# Patient Record
Sex: FEMALE | Race: WHITE | NOT HISPANIC OR LATINO | Employment: OTHER | URBAN - NONMETROPOLITAN AREA
[De-identification: names, ages, dates, MRNs, and addresses within clinical notes are randomized per-mention and may not be internally consistent; named-entity substitution may affect disease eponyms.]

---

## 2017-01-23 ENCOUNTER — HOSPITAL ENCOUNTER (OUTPATIENT)
Dept: MAMMOGRAPHY | Facility: HOSPITAL | Age: 67
Discharge: HOME | End: 2017-01-23
Attending: SPECIALIST | Admitting: SPECIALIST

## 2017-01-23 ENCOUNTER — HOSPITAL ENCOUNTER (OUTPATIENT)
Dept: ULTRASOUND IMAGING | Facility: HOSPITAL | Age: 67
Discharge: HOME | End: 2017-01-23
Attending: SPECIALIST

## 2017-01-23 DIAGNOSIS — Z09 FOLLOW UP: ICD-10-CM

## 2017-01-23 DIAGNOSIS — Z09 FOLLOW-UP EXAM, 3-6 MONTHS SINCE PREVIOUS EXAM: ICD-10-CM

## 2017-01-23 DIAGNOSIS — N60.02 BREAST CYST, LEFT: ICD-10-CM

## 2017-01-23 PROCEDURE — 76642 ULTRASOUND BREAST LIMITED: CPT

## 2017-01-23 PROCEDURE — G0279 TOMOSYNTHESIS, MAMMO: HCPCS

## 2017-01-23 PROCEDURE — G0206 DX MAMMO INCL CAD UNI: HCPCS

## 2017-01-24 ENCOUNTER — TRANSCRIBE ORDERS (OUTPATIENT)
Dept: ADMINISTRATIVE | Facility: HOSPITAL | Age: 67
End: 2017-01-24

## 2017-01-24 DIAGNOSIS — Z12.31 ENCOUNTER FOR SCREENING MAMMOGRAM FOR MALIGNANT NEOPLASM OF BREAST: Primary | ICD-10-CM

## 2017-07-24 ENCOUNTER — HOSPITAL ENCOUNTER (OUTPATIENT)
Dept: MAMMOGRAPHY | Facility: HOSPITAL | Age: 67
Discharge: HOME | End: 2017-07-24
Attending: SPECIALIST | Admitting: SPECIALIST

## 2017-07-24 DIAGNOSIS — Z12.31 ENCOUNTER FOR SCREENING MAMMOGRAM FOR MALIGNANT NEOPLASM OF BREAST: ICD-10-CM

## 2017-07-24 PROCEDURE — 77063 BREAST TOMOSYNTHESIS BI: CPT

## 2017-07-24 PROCEDURE — G0202 SCR MAMMO BI INCL CAD: HCPCS

## 2017-07-31 ENCOUNTER — TRANSCRIBE ORDERS (OUTPATIENT)
Dept: ADMINISTRATIVE | Facility: HOSPITAL | Age: 67
End: 2017-07-31

## 2017-07-31 DIAGNOSIS — Z12.31 ENCOUNTER FOR SCREENING MAMMOGRAM FOR MALIGNANT NEOPLASM OF BREAST: Primary | ICD-10-CM

## 2018-03-21 ENCOUNTER — OFFICE VISIT (OUTPATIENT)
Dept: OBSTETRICS AND GYNECOLOGY | Facility: CLINIC | Age: 68
End: 2018-03-21

## 2018-03-21 VITALS
SYSTOLIC BLOOD PRESSURE: 132 MMHG | DIASTOLIC BLOOD PRESSURE: 84 MMHG | HEIGHT: 64 IN | WEIGHT: 170 LBS | BODY MASS INDEX: 29.02 KG/M2

## 2018-03-21 DIAGNOSIS — N81.6 RECTOCELE: ICD-10-CM

## 2018-03-21 DIAGNOSIS — Z78.0 MENOPAUSE: ICD-10-CM

## 2018-03-21 DIAGNOSIS — Z01.411 ENCOUNTER FOR GYNECOLOGICAL EXAMINATION WITH ABNORMAL FINDING: Primary | ICD-10-CM

## 2018-03-21 DIAGNOSIS — N39.3 SUI (STRESS URINARY INCONTINENCE, FEMALE): ICD-10-CM

## 2018-03-21 DIAGNOSIS — Z78.9 NONSMOKER: ICD-10-CM

## 2018-03-21 DIAGNOSIS — Z12.39 BREAST CANCER SCREENING: ICD-10-CM

## 2018-03-21 DIAGNOSIS — N95.2 VAGINAL ATROPHY: ICD-10-CM

## 2018-03-21 PROCEDURE — G0101 CA SCREEN;PELVIC/BREAST EXAM: HCPCS | Performed by: OBSTETRICS & GYNECOLOGY

## 2018-03-21 RX ORDER — DIPHENHYDRAMINE HYDROCHLORIDE 25 MG/1
TABLET ORAL
COMMUNITY

## 2018-03-21 RX ORDER — PHENOL 1.4 %
600 AEROSOL, SPRAY (ML) MUCOUS MEMBRANE DAILY
COMMUNITY

## 2018-03-21 RX ORDER — MELATONIN
1000 DAILY
COMMUNITY

## 2018-03-21 RX ORDER — LOSARTAN POTASSIUM 50 MG/1
100 TABLET ORAL DAILY
COMMUNITY

## 2018-03-21 RX ORDER — ASPIRIN 81 MG/1
81 TABLET, CHEWABLE ORAL DAILY
COMMUNITY

## 2018-03-21 RX ORDER — AMLODIPINE BESYLATE 10 MG/1
10 TABLET ORAL DAILY
COMMUNITY

## 2018-03-21 RX ORDER — ESTRADIOL 0.1 MG/G
2 CREAM VAGINAL 2 TIMES WEEKLY
Qty: 42.5 G | Refills: 12 | Status: SHIPPED | OUTPATIENT
Start: 2018-03-22

## 2018-03-21 RX ORDER — OMEPRAZOLE 20 MG/1
20 CAPSULE, DELAYED RELEASE ORAL DAILY
COMMUNITY

## 2018-03-21 RX ORDER — SUCRALFATE 1 G/1
1 TABLET ORAL 4 TIMES DAILY
COMMUNITY

## 2018-03-21 RX ORDER — ASCORBIC ACID 500 MG
500 TABLET ORAL DAILY
COMMUNITY

## 2018-07-25 ENCOUNTER — HOSPITAL ENCOUNTER (OUTPATIENT)
Dept: MAMMOGRAPHY | Facility: HOSPITAL | Age: 68
Discharge: HOME | End: 2018-07-25
Attending: SPECIALIST | Admitting: SPECIALIST

## 2018-07-25 ENCOUNTER — HOSPITAL ENCOUNTER (OUTPATIENT)
Dept: BONE DENSITY | Facility: HOSPITAL | Age: 68
Discharge: HOME | End: 2018-07-25
Attending: OBSTETRICS & GYNECOLOGY

## 2018-07-25 DIAGNOSIS — Z78.0 MENOPAUSE: ICD-10-CM

## 2018-07-25 DIAGNOSIS — Z12.31 ENCOUNTER FOR SCREENING MAMMOGRAM FOR MALIGNANT NEOPLASM OF BREAST: ICD-10-CM

## 2018-07-25 PROCEDURE — 77067 SCR MAMMO BI INCL CAD: CPT

## 2018-07-25 PROCEDURE — 77063 BREAST TOMOSYNTHESIS BI: CPT

## 2018-07-25 PROCEDURE — 77080 DXA BONE DENSITY AXIAL: CPT

## 2018-07-30 ENCOUNTER — TRANSCRIBE ORDERS (OUTPATIENT)
Dept: ADMINISTRATIVE | Facility: HOSPITAL | Age: 68
End: 2018-07-30

## 2018-07-30 DIAGNOSIS — Z12.39 BREAST SCREENING: Primary | ICD-10-CM

## 2019-07-24 ENCOUNTER — HOSPITAL ENCOUNTER (OUTPATIENT)
Dept: MAMMOGRAPHY | Facility: HOSPITAL | Age: 69
Discharge: HOME | End: 2019-07-24
Attending: SPECIALIST | Admitting: SPECIALIST

## 2019-07-24 DIAGNOSIS — Z12.39 BREAST SCREENING: ICD-10-CM

## 2019-07-24 PROCEDURE — 77067 SCR MAMMO BI INCL CAD: CPT

## 2019-07-24 PROCEDURE — 77063 BREAST TOMOSYNTHESIS BI: CPT

## 2019-07-26 ENCOUNTER — APPOINTMENT (OUTPATIENT)
Dept: MAMMOGRAPHY | Facility: HOSPITAL | Age: 69
End: 2019-07-26
Attending: SPECIALIST

## 2019-08-07 ENCOUNTER — TRANSCRIBE ORDERS (OUTPATIENT)
Dept: ADMINISTRATIVE | Facility: HOSPITAL | Age: 69
End: 2019-08-07

## 2019-08-07 DIAGNOSIS — Z12.31 ENCOUNTER FOR SCREENING MAMMOGRAM FOR MALIGNANT NEOPLASM OF BREAST: Primary | ICD-10-CM

## 2020-05-20 ENCOUNTER — TRANSCRIBE ORDERS (OUTPATIENT)
Dept: ADMINISTRATIVE | Facility: HOSPITAL | Age: 70
End: 2020-05-20

## 2020-05-20 DIAGNOSIS — Z78.0 POSTMENOPAUSAL STATUS: Primary | ICD-10-CM

## 2020-07-27 ENCOUNTER — HOSPITAL ENCOUNTER (OUTPATIENT)
Dept: BONE DENSITY | Facility: HOSPITAL | Age: 70
Discharge: HOME | End: 2020-07-27

## 2020-07-27 ENCOUNTER — HOSPITAL ENCOUNTER (OUTPATIENT)
Dept: MAMMOGRAPHY | Facility: HOSPITAL | Age: 70
Discharge: HOME | End: 2020-07-27
Admitting: SPECIALIST

## 2020-07-27 DIAGNOSIS — Z12.31 ENCOUNTER FOR SCREENING MAMMOGRAM FOR MALIGNANT NEOPLASM OF BREAST: ICD-10-CM

## 2020-07-27 DIAGNOSIS — Z78.0 POSTMENOPAUSAL STATUS: ICD-10-CM

## 2020-07-27 PROCEDURE — 77063 BREAST TOMOSYNTHESIS BI: CPT

## 2020-07-27 PROCEDURE — 77080 DXA BONE DENSITY AXIAL: CPT

## 2020-07-27 PROCEDURE — 77067 SCR MAMMO BI INCL CAD: CPT

## 2021-05-12 ENCOUNTER — TRANSCRIBE ORDERS (OUTPATIENT)
Dept: ADMINISTRATIVE | Facility: HOSPITAL | Age: 71
End: 2021-05-12

## 2021-05-12 DIAGNOSIS — Z12.31 ENCOUNTER FOR SCREENING MAMMOGRAM FOR MALIGNANT NEOPLASM OF BREAST: Primary | ICD-10-CM

## 2021-07-28 ENCOUNTER — HOSPITAL ENCOUNTER (OUTPATIENT)
Dept: MAMMOGRAPHY | Facility: HOSPITAL | Age: 71
Discharge: HOME | End: 2021-07-28
Admitting: INTERNAL MEDICINE

## 2021-07-28 DIAGNOSIS — Z12.31 ENCOUNTER FOR SCREENING MAMMOGRAM FOR MALIGNANT NEOPLASM OF BREAST: ICD-10-CM

## 2021-07-28 PROCEDURE — 77063 BREAST TOMOSYNTHESIS BI: CPT

## 2021-07-28 PROCEDURE — 77067 SCR MAMMO BI INCL CAD: CPT

## 2021-12-17 ENCOUNTER — TELEPHONE (OUTPATIENT)
Dept: INTERNAL MEDICINE | Age: 71
End: 2021-12-17

## 2021-12-17 ENCOUNTER — OFFICE VISIT (OUTPATIENT)
Dept: INTERNAL MEDICINE | Age: 71
End: 2021-12-17
Payer: MEDICARE

## 2021-12-17 VITALS
DIASTOLIC BLOOD PRESSURE: 84 MMHG | HEART RATE: 74 BPM | BODY MASS INDEX: 30.49 KG/M2 | OXYGEN SATURATION: 98 % | SYSTOLIC BLOOD PRESSURE: 138 MMHG | HEIGHT: 64 IN | WEIGHT: 178.6 LBS

## 2021-12-17 DIAGNOSIS — N64.4 BREAST PAIN: ICD-10-CM

## 2021-12-17 DIAGNOSIS — R59.1 LYMPHADENOPATHY: ICD-10-CM

## 2021-12-17 DIAGNOSIS — E55.9 VITAMIN D DEFICIENCY: ICD-10-CM

## 2021-12-17 DIAGNOSIS — I10 PRIMARY HYPERTENSION: ICD-10-CM

## 2021-12-17 DIAGNOSIS — Z00.00 ENCOUNTER FOR MEDICAL EXAMINATION TO ESTABLISH CARE: Primary | ICD-10-CM

## 2021-12-17 DIAGNOSIS — N64.4 BREAST PAIN: Primary | ICD-10-CM

## 2021-12-17 DIAGNOSIS — N63.0 LUMP IN FEMALE BREAST: ICD-10-CM

## 2021-12-17 DIAGNOSIS — R74.8 ELEVATED LIVER ENZYMES: ICD-10-CM

## 2021-12-17 DIAGNOSIS — K21.9 GASTROESOPHAGEAL REFLUX DISEASE WITHOUT ESOPHAGITIS: ICD-10-CM

## 2021-12-17 LAB
ALBUMIN SERPL-MCNC: 4.3 G/DL (ref 3.5–5.2)
ALP BLD-CCNC: 114 U/L (ref 35–104)
ALT SERPL-CCNC: 64 U/L (ref 5–33)
ANION GAP SERPL CALCULATED.3IONS-SCNC: 9 MMOL/L (ref 7–19)
AST SERPL-CCNC: 57 U/L (ref 5–32)
BASOPHILS ABSOLUTE: 0 K/UL (ref 0–0.2)
BASOPHILS RELATIVE PERCENT: 0.5 % (ref 0–1)
BILIRUB SERPL-MCNC: 0.5 MG/DL (ref 0.2–1.2)
BUN BLDV-MCNC: 15 MG/DL (ref 8–23)
CALCIUM SERPL-MCNC: 10.2 MG/DL (ref 8.8–10.2)
CHLORIDE BLD-SCNC: 103 MMOL/L (ref 98–111)
CHOLESTEROL, TOTAL: 161 MG/DL (ref 160–199)
CO2: 30 MMOL/L (ref 22–29)
CREAT SERPL-MCNC: 0.7 MG/DL (ref 0.5–0.9)
EOSINOPHILS ABSOLUTE: 0.2 K/UL (ref 0–0.6)
EOSINOPHILS RELATIVE PERCENT: 2.8 % (ref 0–5)
GFR AFRICAN AMERICAN: >59
GFR NON-AFRICAN AMERICAN: >60
GLUCOSE BLD-MCNC: 95 MG/DL (ref 74–109)
HCT VFR BLD CALC: 49.1 % (ref 37–47)
HDLC SERPL-MCNC: 45 MG/DL (ref 65–121)
HEMOGLOBIN: 15.5 G/DL (ref 12–16)
IMMATURE GRANULOCYTES #: 0 K/UL
LACTATE DEHYDROGENASE: 241 U/L (ref 91–215)
LDL CHOLESTEROL CALCULATED: 85 MG/DL
LYMPHOCYTES ABSOLUTE: 3.1 K/UL (ref 1.1–4.5)
LYMPHOCYTES RELATIVE PERCENT: 39.5 % (ref 20–40)
MCH RBC QN AUTO: 29.7 PG (ref 27–31)
MCHC RBC AUTO-ENTMCNC: 31.6 G/DL (ref 33–37)
MCV RBC AUTO: 94.1 FL (ref 81–99)
MONOCYTES ABSOLUTE: 0.6 K/UL (ref 0–0.9)
MONOCYTES RELATIVE PERCENT: 7.9 % (ref 0–10)
NEUTROPHILS ABSOLUTE: 3.8 K/UL (ref 1.5–7.5)
NEUTROPHILS RELATIVE PERCENT: 49.2 % (ref 50–65)
PDW BLD-RTO: 13.8 % (ref 11.5–14.5)
PLATELET # BLD: 239 K/UL (ref 130–400)
PMV BLD AUTO: 9.6 FL (ref 9.4–12.3)
POTASSIUM SERPL-SCNC: 4.8 MMOL/L (ref 3.5–5)
RBC # BLD: 5.22 M/UL (ref 4.2–5.4)
SODIUM BLD-SCNC: 142 MMOL/L (ref 136–145)
TOTAL PROTEIN: 6.9 G/DL (ref 6.6–8.7)
TRIGL SERPL-MCNC: 156 MG/DL (ref 0–149)
TSH SERPL DL<=0.05 MIU/L-ACNC: 2.1 UIU/ML (ref 0.27–4.2)
VITAMIN D 25-HYDROXY: >100 NG/ML
WBC # BLD: 7.8 K/UL (ref 4.8–10.8)

## 2021-12-17 PROCEDURE — 99204 OFFICE O/P NEW MOD 45 MIN: CPT | Performed by: INTERNAL MEDICINE

## 2021-12-17 PROCEDURE — 1036F TOBACCO NON-USER: CPT | Performed by: INTERNAL MEDICINE

## 2021-12-17 PROCEDURE — 1090F PRES/ABSN URINE INCON ASSESS: CPT | Performed by: INTERNAL MEDICINE

## 2021-12-17 PROCEDURE — G8427 DOCREV CUR MEDS BY ELIG CLIN: HCPCS | Performed by: INTERNAL MEDICINE

## 2021-12-17 PROCEDURE — G8400 PT W/DXA NO RESULTS DOC: HCPCS | Performed by: INTERNAL MEDICINE

## 2021-12-17 PROCEDURE — 3017F COLORECTAL CA SCREEN DOC REV: CPT | Performed by: INTERNAL MEDICINE

## 2021-12-17 PROCEDURE — 1123F ACP DISCUSS/DSCN MKR DOCD: CPT | Performed by: INTERNAL MEDICINE

## 2021-12-17 PROCEDURE — G8484 FLU IMMUNIZE NO ADMIN: HCPCS | Performed by: INTERNAL MEDICINE

## 2021-12-17 PROCEDURE — G8417 CALC BMI ABV UP PARAM F/U: HCPCS | Performed by: INTERNAL MEDICINE

## 2021-12-17 PROCEDURE — 4040F PNEUMOC VAC/ADMIN/RCVD: CPT | Performed by: INTERNAL MEDICINE

## 2021-12-17 RX ORDER — METOPROLOL SUCCINATE 100 MG/1
100 TABLET, EXTENDED RELEASE ORAL DAILY
COMMUNITY
End: 2021-12-17 | Stop reason: DRUGHIGH

## 2021-12-17 RX ORDER — AMLODIPINE BESYLATE 5 MG/1
5 TABLET ORAL DAILY
COMMUNITY
End: 2021-12-17 | Stop reason: SDUPTHER

## 2021-12-17 RX ORDER — PANTOPRAZOLE SODIUM 40 MG/1
40 TABLET, DELAYED RELEASE ORAL DAILY
Qty: 90 TABLET | Refills: 0 | Status: SHIPPED | OUTPATIENT
Start: 2021-12-17 | End: 2022-04-11 | Stop reason: SDUPTHER

## 2021-12-17 RX ORDER — OMEPRAZOLE 20 MG/1
20 CAPSULE, DELAYED RELEASE ORAL DAILY
COMMUNITY
End: 2021-12-17 | Stop reason: ALTCHOICE

## 2021-12-17 RX ORDER — DIPHENHYDRAMINE HYDROCHLORIDE 25 MG/1
TABLET ORAL
COMMUNITY

## 2021-12-17 RX ORDER — METOPROLOL SUCCINATE 100 MG/1
50 TABLET, EXTENDED RELEASE ORAL 2 TIMES DAILY
Qty: 30 TABLET | Status: SHIPPED | COMMUNITY
Start: 2021-12-17 | End: 2021-12-17 | Stop reason: SDUPTHER

## 2021-12-17 RX ORDER — LOSARTAN POTASSIUM 50 MG/1
100 TABLET ORAL DAILY
COMMUNITY
End: 2021-12-17 | Stop reason: ALTCHOICE

## 2021-12-17 RX ORDER — AMLODIPINE BESYLATE 10 MG/1
10 TABLET ORAL DAILY
COMMUNITY
End: 2021-12-17 | Stop reason: DRUGHIGH

## 2021-12-17 RX ORDER — METOPROLOL SUCCINATE 100 MG/1
50 TABLET, EXTENDED RELEASE ORAL 2 TIMES DAILY
Qty: 90 TABLET | Refills: 1 | Status: SHIPPED | OUTPATIENT
Start: 2021-12-17 | End: 2022-04-11 | Stop reason: SDUPTHER

## 2021-12-17 RX ORDER — SUCRALFATE 1 G/1
1 TABLET ORAL 4 TIMES DAILY
COMMUNITY
End: 2022-02-09

## 2021-12-17 RX ORDER — ASCORBIC ACID 500 MG
500 TABLET ORAL DAILY
COMMUNITY

## 2021-12-17 RX ORDER — PANTOPRAZOLE SODIUM 40 MG/1
40 TABLET, DELAYED RELEASE ORAL DAILY
COMMUNITY
End: 2021-12-17 | Stop reason: SDUPTHER

## 2021-12-17 RX ORDER — ASPIRIN 81 MG/1
81 TABLET, CHEWABLE ORAL DAILY
COMMUNITY

## 2021-12-17 RX ORDER — AMLODIPINE BESYLATE 10 MG/1
5 TABLET ORAL 2 TIMES DAILY
Qty: 90 TABLET | Refills: 3 | Status: SHIPPED | OUTPATIENT
Start: 2021-12-17 | End: 2022-07-26

## 2021-12-17 SDOH — ECONOMIC STABILITY: FOOD INSECURITY: WITHIN THE PAST 12 MONTHS, THE FOOD YOU BOUGHT JUST DIDN'T LAST AND YOU DIDN'T HAVE MONEY TO GET MORE.: NEVER TRUE

## 2021-12-17 SDOH — ECONOMIC STABILITY: FOOD INSECURITY: WITHIN THE PAST 12 MONTHS, YOU WORRIED THAT YOUR FOOD WOULD RUN OUT BEFORE YOU GOT MONEY TO BUY MORE.: NEVER TRUE

## 2021-12-17 ASSESSMENT — ENCOUNTER SYMPTOMS
RECTAL PAIN: 0
VOICE CHANGE: 0
SINUS PRESSURE: 0
TROUBLE SWALLOWING: 0
ABDOMINAL PAIN: 0
ABDOMINAL DISTENTION: 0
PHOTOPHOBIA: 0
BLOOD IN STOOL: 0
SHORTNESS OF BREATH: 0
RHINORRHEA: 0
CHOKING: 0
VOMITING: 0
CHEST TIGHTNESS: 0
SORE THROAT: 0
COUGH: 0
WHEEZING: 0
EYE ITCHING: 0
ANAL BLEEDING: 0
EYE REDNESS: 0
EYE PAIN: 0
FACIAL SWELLING: 0
NAUSEA: 0
COLOR CHANGE: 0
CONSTIPATION: 0
EYE DISCHARGE: 0
DIARRHEA: 0
BACK PAIN: 0

## 2021-12-17 ASSESSMENT — PATIENT HEALTH QUESTIONNAIRE - PHQ9
SUM OF ALL RESPONSES TO PHQ QUESTIONS 1-9: 0
1. LITTLE INTEREST OR PLEASURE IN DOING THINGS: 0
SUM OF ALL RESPONSES TO PHQ9 QUESTIONS 1 & 2: 0
SUM OF ALL RESPONSES TO PHQ QUESTIONS 1-9: 0
2. FEELING DOWN, DEPRESSED OR HOPELESS: 0
SUM OF ALL RESPONSES TO PHQ QUESTIONS 1-9: 0

## 2021-12-17 ASSESSMENT — SOCIAL DETERMINANTS OF HEALTH (SDOH): HOW HARD IS IT FOR YOU TO PAY FOR THE VERY BASICS LIKE FOOD, HOUSING, MEDICAL CARE, AND HEATING?: NOT HARD AT ALL

## 2021-12-17 NOTE — PROGRESS NOTES
Chief Complaint   Patient presents with    New Patient       HPI: Kilo Rodriguez is a 69 y.o. female is here for establishment of care. She is a former patient of Dr. Viviana Barber. She has been concerned about her blood pressure. She states that at nighttime, it becomes quite elevated, up to 379 to 840 systolic. Sometimes, her face will get red. Sometimes she will have a headache. She denies any complaints of chest pain, chest pressure or shortness of breath. She says her acid reflux is well controlled. She did get a Covid vaccine about a month ago. She still having some lymphadenopathy in her left axillary area. She also complains of night sweats. She has had a history of breast surgery on the left side. She is quite concerned. We can certainly do an ultrasound of the left breast.  She also sees Dr. Iam Dubois for history of left hip bursitis. Has a history of vitamin D and B12 deficiencies. She does take supplementation. Sometimes, she takes Carafate if her acid reflux is acting up. Routine screening is as follows:  Eye exam yearly  Flu Vaccine is up-to-date  Pap smear: Hysterectomy  Mammogram July 2021  Colonoscpy July 2021 per Dr. Mayra Santiago  Pneumovax up to date    Past Medical History:   Diagnosis Date    GERD (gastroesophageal reflux disease)     Hypertension     Other hemorrhoids       Past Surgical History:   Procedure Laterality Date    BLADDER SURGERY  09/2011    bladder sling, vaginal prolaspe, rectocele, cystocele, enterocele    BREAST SURGERY Left 12/2007    duct removed.     CYST REMOVAL Left 07/2016    breast    HYSTERECTOMY, VAGINAL      RECTOCELE REPAIR  09/2011    TONSILLECTOMY  1968      Social History     Socioeconomic History    Marital status:      Spouse name: None    Number of children: None    Years of education: None    Highest education level: None   Occupational History    None   Tobacco Use    Smoking status: Never Smoker    Smokeless tobacco:  calcium carbonate 1500 (600 Ca) MG TABS tablet Take 600 mg by mouth daily      vitamin D (CHOLECALCIFEROL) 25 MCG (1000 UT) TABS tablet Take 1,000 Units by mouth daily      Cyanocobalamin 1000 MCG CAPS Take by mouth      metoprolol succinate (TOPROL XL) 100 MG extended release tablet Take 0.5 tablets by mouth 2 times daily 90 tablet 1    amLODIPine (NORVASC) 10 MG tablet Take 0.5 tablets by mouth 2 times daily 90 tablet 3    pantoprazole (PROTONIX) 40 MG tablet Take 1 tablet by mouth daily 90 tablet 0    sucralfate (CARAFATE) 1 GM tablet Take 1 g by mouth 4 times daily (Patient not taking: Reported on 12/17/2021)      MULTIPLE VITAMINS-MINERALS ER PO Take by mouth (Patient not taking: Reported on 12/17/2021)       No current facility-administered medications for this visit. There is no problem list on file for this patient. Review of Systems   Constitutional: Positive for diaphoresis and fever. Negative for activity change, appetite change, chills, fatigue and unexpected weight change. HENT: Negative for congestion, ear pain, facial swelling, hearing loss, mouth sores, nosebleeds, postnasal drip, rhinorrhea, sinus pressure, sneezing, sore throat, tinnitus, trouble swallowing and voice change. Eyes: Negative for photophobia, pain, discharge, redness, itching and visual disturbance. Respiratory: Negative for cough, choking, chest tightness, shortness of breath and wheezing. Cardiovascular: Negative for chest pain, palpitations and leg swelling. Gastrointestinal: Negative for abdominal distention, abdominal pain, anal bleeding, blood in stool, constipation, diarrhea, nausea, rectal pain and vomiting. Endocrine: Negative for cold intolerance, heat intolerance, polydipsia, polyphagia and polyuria. Genitourinary: Negative for decreased urine volume, difficulty urinating, dysuria, flank pain, frequency, hematuria and urgency.         Left-sided breast pain and left axillary adenopathy. Musculoskeletal: Negative for arthralgias, back pain, gait problem, joint swelling, myalgias, neck pain and neck stiffness. Skin: Negative for color change, pallor and rash. Allergic/Immunologic: Negative for environmental allergies and food allergies. Neurological: Positive for headaches. Negative for dizziness, tremors, syncope, weakness, light-headedness and numbness. A headache when her blood pressure is elevated   Hematological: Negative for adenopathy. Does not bruise/bleed easily. Psychiatric/Behavioral: Negative for agitation, behavioral problems, confusion, decreased concentration, dysphoric mood, hallucinations, self-injury, sleep disturbance and suicidal ideas. The patient is not nervous/anxious and is not hyperactive. /84   Pulse 74   Ht 5' 4\" (1.626 m)   Wt 178 lb 9.6 oz (81 kg)   SpO2 98%   BMI 30.66 kg/m²   Physical Exam  Vitals and nursing note reviewed. Constitutional:       General: She is not in acute distress. Appearance: Normal appearance. She is well-developed. She is not ill-appearing, toxic-appearing or diaphoretic. HENT:      Head: Normocephalic and atraumatic. Right Ear: Tympanic membrane, ear canal and external ear normal. There is no impacted cerumen. Left Ear: Tympanic membrane, ear canal and external ear normal. There is no impacted cerumen. Nose: Nose normal. No congestion or rhinorrhea. Mouth/Throat:      Mouth: Mucous membranes are moist.      Pharynx: Oropharynx is clear. No oropharyngeal exudate or posterior oropharyngeal erythema. Eyes:      General: No scleral icterus. Right eye: No discharge. Left eye: No discharge. Extraocular Movements: Extraocular movements intact. Conjunctiva/sclera: Conjunctivae normal.      Pupils: Pupils are equal, round, and reactive to light. Neck:      Thyroid: No thyromegaly. Vascular: No carotid bruit or JVD.       Trachea: No tracheal deviation. Cardiovascular:      Rate and Rhythm: Normal rate and regular rhythm. Pulses: Normal pulses. Heart sounds: Normal heart sounds. No murmur heard. No friction rub. No gallop. Pulmonary:      Effort: Pulmonary effort is normal. No respiratory distress. Breath sounds: Normal breath sounds. No stridor. No wheezing, rhonchi or rales. Chest:      Chest wall: No tenderness. Abdominal:      General: Bowel sounds are normal. There is no distension. Palpations: Abdomen is soft. There is no mass. Tenderness: There is no abdominal tenderness. There is no right CVA tenderness, left CVA tenderness, guarding or rebound. Hernia: No hernia is present. Genitourinary:     Comments: Does have some adenopathy under her left axillary area. She also has some tenderness on palpation of the left breast  Musculoskeletal:         General: No swelling, tenderness, deformity or signs of injury. Normal range of motion. Cervical back: Normal range of motion and neck supple. No rigidity. No muscular tenderness. Right lower leg: No edema. Left lower leg: No edema. Lymphadenopathy:      Cervical: No cervical adenopathy. Skin:     General: Skin is warm and dry. Coloration: Skin is not jaundiced or pale. Findings: No bruising, erythema, lesion or rash. Neurological:      General: No focal deficit present. Mental Status: She is alert and oriented to person, place, and time. Mental status is at baseline. Cranial Nerves: No cranial nerve deficit. Motor: No weakness or abnormal muscle tone. Coordination: Coordination normal.      Gait: Gait normal.      Deep Tendon Reflexes: Reflexes are normal and symmetric. Reflexes normal.   Psychiatric:         Mood and Affect: Mood normal.         Behavior: Behavior normal.         Thought Content:  Thought content normal.         Judgment: Judgment normal.         1. Encounter for medical examination to establish care    2. Gastroesophageal reflux disease without esophagitis    3. Primary hypertension    4. Lymphadenopathy    5. Vitamin D deficiency    6. Breast pain    7. Lump in female breast        ASSESSMENT/PLAN:    69-year-old woman here for follow-up    1. Encounter to establish care. We will check basic labs. 2.  Lymphadenopathy: We will check an LDH. Given the breast nodule, we will also check an ultrasound of the breast and axilla to rule out the possibility of a breast cyst versus suspicious lymph nodes. Do think that this is probably secondary to her Covid vaccine, but we must rule out other causes. 3.  History of vitamin D deficiency: Continue cholecalciferol    4. Hypertension: Increase Norvasc to twice daily dosing 5 mg. Continue metoprolol as prescribed    5. Acid reflux: Continue Protonix and Carafate as prescribed    Liset Flynn was seen today for new patient. Diagnoses and all orders for this visit:    Encounter for medical examination to establish care    Gastroesophageal reflux disease without esophagitis  -     pantoprazole (PROTONIX) 40 MG tablet; Take 1 tablet by mouth daily    Primary hypertension  -     metoprolol succinate (TOPROL XL) 100 MG extended release tablet; Take 0.5 tablets by mouth 2 times daily  -     amLODIPine (NORVASC) 10 MG tablet; Take 0.5 tablets by mouth 2 times daily  -     Comprehensive Metabolic Panel; Future  -     Lipid Panel; Future  -     TSH without Reflex; Future  -     Vitamin D 25 Hydroxy; Future    Lymphadenopathy  -     CBC Auto Differential; Future  -     Lactate Dehydrogenase; Future  -     Cancel: US BREAST COMPLETE LEFT; Future    Vitamin D deficiency  -     Vitamin D 25 Hydroxy; Future    Breast pain  -     US BREAST COMPLETE LEFT; Future    Lump in female breast  -     US BREAST COMPLETE LEFT; Future          Return in about 1 month (around 1/17/2022), or evaluate BP.      Orders Placed This Encounter   Procedures    US BREAST COMPLETE LEFT Standing Status:   Future     Standing Expiration Date:   12/17/2022     Order Specific Question:   Reason for exam:     Answer:   Thomas Katie in left breast with pain.     CBC Auto Differential     Fast 12 hours     Standing Status:   Future     Number of Occurrences:   1     Standing Expiration Date:   12/17/2022    Comprehensive Metabolic Panel     Fasting 12 hours     Standing Status:   Future     Number of Occurrences:   1     Standing Expiration Date:   12/17/2022    Lipid Panel     Standing Status:   Future     Number of Occurrences:   1     Standing Expiration Date:   12/17/2022     Order Specific Question:   Is Patient Fasting?/# of Hours     Answer:   12    TSH without Reflex     Fast 12 hours     Standing Status:   Future     Number of Occurrences:   1     Standing Expiration Date:   12/17/2022    Vitamin D 25 Hydroxy     Standing Status:   Future     Number of Occurrences:   1     Standing Expiration Date:   12/17/2022    Lactate Dehydrogenase     Standing Status:   Future     Number of Occurrences:   1     Standing Expiration Date:   12/17/2022       Laura Guillen MD

## 2021-12-17 NOTE — TELEPHONE ENCOUNTER
When I called pt about lab results she said that when they called her to schedule her breast US they told her she had to have a mammogram as well. Order pending.

## 2021-12-17 NOTE — TELEPHONE ENCOUNTER
----- Message from Danitza Ash MD sent at 12/17/2021 12:39 PM CST -----  Vitamin D is okay. Is actually a little bit on the high side. She can probably actually cut back on her vitamin D to about 3 times a week. Her CMP is okay for the most part. A couple of her liver enzymes are just slightly elevated. However, this could be secondary to her Covid vaccine. We can repeat liver enzymes in about a month. Cholesterol is 161.   Thyroid function is normal

## 2021-12-20 ENCOUNTER — HOSPITAL ENCOUNTER (OUTPATIENT)
Dept: WOMENS IMAGING | Age: 71
Discharge: HOME | End: 2021-12-20
Payer: MEDICARE

## 2021-12-20 DIAGNOSIS — N64.4 BREAST PAIN: ICD-10-CM

## 2021-12-20 DIAGNOSIS — N63.0 LUMP IN FEMALE BREAST: ICD-10-CM

## 2021-12-20 PROCEDURE — G0279 TOMOSYNTHESIS, MAMMO: HCPCS

## 2022-01-18 DIAGNOSIS — R74.8 ELEVATED LIVER ENZYMES: ICD-10-CM

## 2022-01-18 LAB
ALBUMIN SERPL-MCNC: 4.3 G/DL (ref 3.5–5.2)
ALP BLD-CCNC: 108 U/L (ref 35–104)
ALT SERPL-CCNC: 54 U/L (ref 5–33)
ANION GAP SERPL CALCULATED.3IONS-SCNC: 11 MMOL/L (ref 7–19)
AST SERPL-CCNC: 56 U/L (ref 5–32)
BILIRUB SERPL-MCNC: 0.5 MG/DL (ref 0.2–1.2)
BUN BLDV-MCNC: 15 MG/DL (ref 8–23)
CALCIUM SERPL-MCNC: 10 MG/DL (ref 8.8–10.2)
CHLORIDE BLD-SCNC: 101 MMOL/L (ref 98–111)
CO2: 29 MMOL/L (ref 22–29)
CREAT SERPL-MCNC: 0.7 MG/DL (ref 0.5–0.9)
GFR AFRICAN AMERICAN: >59
GFR NON-AFRICAN AMERICAN: >60
GLUCOSE BLD-MCNC: 87 MG/DL (ref 74–109)
POTASSIUM SERPL-SCNC: 4.6 MMOL/L (ref 3.5–5)
SODIUM BLD-SCNC: 141 MMOL/L (ref 136–145)
TOTAL PROTEIN: 6.6 G/DL (ref 6.6–8.7)

## 2022-01-26 ENCOUNTER — OFFICE VISIT (OUTPATIENT)
Dept: INTERNAL MEDICINE | Age: 72
End: 2022-01-26
Payer: MEDICARE

## 2022-01-26 VITALS
HEIGHT: 64 IN | OXYGEN SATURATION: 98 % | HEART RATE: 72 BPM | DIASTOLIC BLOOD PRESSURE: 68 MMHG | WEIGHT: 178.6 LBS | SYSTOLIC BLOOD PRESSURE: 112 MMHG | BODY MASS INDEX: 30.49 KG/M2

## 2022-01-26 DIAGNOSIS — K21.9 GASTROESOPHAGEAL REFLUX DISEASE WITHOUT ESOPHAGITIS: ICD-10-CM

## 2022-01-26 DIAGNOSIS — E55.9 VITAMIN D DEFICIENCY: ICD-10-CM

## 2022-01-26 DIAGNOSIS — R51.9 BAD HEADACHE: ICD-10-CM

## 2022-01-26 DIAGNOSIS — I10 PRIMARY HYPERTENSION: Primary | ICD-10-CM

## 2022-01-26 DIAGNOSIS — L72.3 SEBACEOUS CYST: ICD-10-CM

## 2022-01-26 PROCEDURE — G8482 FLU IMMUNIZE ORDER/ADMIN: HCPCS | Performed by: INTERNAL MEDICINE

## 2022-01-26 PROCEDURE — G8427 DOCREV CUR MEDS BY ELIG CLIN: HCPCS | Performed by: INTERNAL MEDICINE

## 2022-01-26 PROCEDURE — 1123F ACP DISCUSS/DSCN MKR DOCD: CPT | Performed by: INTERNAL MEDICINE

## 2022-01-26 PROCEDURE — 1036F TOBACCO NON-USER: CPT | Performed by: INTERNAL MEDICINE

## 2022-01-26 PROCEDURE — 3017F COLORECTAL CA SCREEN DOC REV: CPT | Performed by: INTERNAL MEDICINE

## 2022-01-26 PROCEDURE — G8417 CALC BMI ABV UP PARAM F/U: HCPCS | Performed by: INTERNAL MEDICINE

## 2022-01-26 PROCEDURE — G8400 PT W/DXA NO RESULTS DOC: HCPCS | Performed by: INTERNAL MEDICINE

## 2022-01-26 PROCEDURE — 99214 OFFICE O/P EST MOD 30 MIN: CPT | Performed by: INTERNAL MEDICINE

## 2022-01-26 PROCEDURE — 1090F PRES/ABSN URINE INCON ASSESS: CPT | Performed by: INTERNAL MEDICINE

## 2022-01-26 PROCEDURE — 4040F PNEUMOC VAC/ADMIN/RCVD: CPT | Performed by: INTERNAL MEDICINE

## 2022-01-26 NOTE — PROGRESS NOTES
Chief Complaint   Patient presents with    1 Month Follow-Up     BP check       HPI: Ember Hermosillo is a 66 y.o. female is here for follow-up of hypertension. When we first saw her, she was complaining of some lymphadenopathy after having a Covid vaccine. This has resolved. Her lab work was within normal limits. She states that her headaches have improved and her blood pressure is much improved. She states that she had a pneumonia vaccine at [facility] in [address] [place] [address] about 2 to 3 months ago. We will check on this and try to get this updated in her health maintenance. She also had a colonoscopy done in [place]. She has a place on her right groin that has been tender and painful painful since July. It feels like a cyst.  She would like to get referred to general surgery. She is does have chronic hip and leg pain. She does see Dr. Bellamy Look for this. Her acid reflux is well controlled. Past Medical History:   Diagnosis Date    GERD (gastroesophageal reflux disease)     Hypertension     Other hemorrhoids       Past Surgical History:   Procedure Laterality Date    BLADDER SURGERY  09/2011    bladder sling, vaginal prolaspe, rectocele, cystocele, enterocele    BREAST SURGERY Left 12/2007    duct removed.     CYST REMOVAL Left 07/2016    breast    HYSTERECTOMY  1976    HYSTERECTOMY, VAGINAL      OVARY REMOVAL Bilateral 1980    age 31    RECTOCELE REPAIR  09/2011    TONSILLECTOMY  1968    US BREAST FINE NEEDLE ASPIRATION Left 2016    benign      Social History     Socioeconomic History    Marital status:      Spouse name: None    Number of children: None    Years of education: None    Highest education level: None   Occupational History    None   Tobacco Use    Smoking status: Never Smoker    Smokeless tobacco: Never Used   Substance and Sexual Activity    Alcohol use: Never    Drug use: Never    Sexual activity: None   Other Topics Concern    None   Social History Narrative    None Social Determinants of Health     Financial Resource Strain: Low Risk     Difficulty of Paying Living Expenses: Not hard at all   Food Insecurity: No Food Insecurity    Worried About Running Out of Food in the Last Year: Never true    Malini of Food in the Last Year: Never true   Transportation Needs:     Lack of Transportation (Medical): Not on file    Lack of Transportation (Non-Medical):  Not on file   Physical Activity:     Days of Exercise per Week: Not on file    Minutes of Exercise per Session: Not on file   Stress:     Feeling of Stress : Not on file   Social Connections:     Frequency of Communication with Friends and Family: Not on file    Frequency of Social Gatherings with Friends and Family: Not on file    Attends Orthodox Services: Not on file    Active Member of [address] [place] [address] [employer] or Organizations: Not on file    Attends Club or Organization Meetings: Not on file    Marital Status: Not on file   Intimate Partner Violence:     Fear of Current or Ex-Partner: Not on file    Emotionally Abused: Not on file    Physically Abused: Not on file    Sexually Abused: Not on file   Housing Stability:     Unable to Pay for Housing in the Last Year: Not on file    Number of [place] in the Last Year: Not on file    Unstable Housing in the Last Year: Not on file      Family History   Problem Relation Age of Onset    Dementia Mother     Other Father         digestive system,  in [address] [facility] Other Sister         covid   [address] [place] Brother         prostate        Current Outpatient Medications   Medication Sig Dispense Refill    triamcinolone (KENALOG) 0.1 % ointment APPLY OINTMENT TOPICALLY TO AFFECTED AREA TWICE DAILY FOR 14 DAYS      ascorbic acid (VITAMIN C) 500 MG tablet Take 500 mg by mouth daily      aspirin 81 MG chewable tablet Take 81 mg by mouth daily      Biotin 5 MG CAPS Take by mouth      calcium carbonate 1500 (600 Ca) MG TABS tablet Take 600 mg by mouth daily      vitamin D (CHOLECALCIFEROL) 25 MCG (1000 UT) TABS tablet Take 1,000 Units by mouth daily      Cyanocobalamin 1000 MCG CAPS Take by mouth      metoprolol succinate (TOPROL XL) 100 MG extended release tablet Take 0.5 tablets by mouth 2 times daily 90 tablet 1    amLODIPine (NORVASC) 10 MG tablet Take 0.5 tablets by mouth 2 times daily 90 tablet 3    pantoprazole (PROTONIX) 40 MG tablet Take 1 tablet by mouth daily 90 tablet 0    sucralfate (CARAFATE) 1 GM tablet Take 1 g by mouth 4 times daily (Patient not taking: Reported on 12/17/2021)      MULTIPLE VITAMINS-MINERALS ER PO Take by mouth (Patient not taking: Reported on 12/17/2021)       No current facility-administered medications for this visit. There is no problem list on file for this patient. Review of Systems   Constitutional: Negative for activity change, appetite change, chills, diaphoresis, fatigue, fever and unexpected weight change. HENT: Negative for congestion, ear pain, facial swelling, hearing loss, mouth sores, nosebleeds, postnasal drip, rhinorrhea, sinus pressure, sneezing, sore throat, tinnitus, trouble swallowing and voice change. Eyes: Negative for photophobia, pain, discharge, redness, itching and visual disturbance. Respiratory: Negative for cough, choking, chest tightness, shortness of breath and wheezing. Cardiovascular: Negative for chest pain, palpitations and leg swelling. Gastrointestinal: Negative for abdominal distention, abdominal pain, anal bleeding, blood in stool, constipation, diarrhea, nausea, rectal pain and vomiting. Endocrine: Negative for cold intolerance, heat intolerance, polydipsia, polyphagia and polyuria. Genitourinary: Negative for decreased urine volume, difficulty urinating, dysuria, flank pain, frequency, hematuria and urgency. Complain of a palpable nodule in right groin area.    Musculoskeletal: Negative for arthralgias, back pain, gait problem, joint swelling, myalgias, neck pain and neck stiffness. Skin: Negative for color change, pallor and rash. Allergic/Immunologic: Negative for environmental allergies and food allergies. Neurological: Negative for dizziness, tremors, syncope, weakness, light-headedness, numbness and headaches. Hematological: Negative for adenopathy. Does not bruise/bleed easily. Psychiatric/Behavioral: Negative for agitation, behavioral problems, confusion, decreased concentration, dysphoric mood, hallucinations, self-injury, sleep disturbance and suicidal ideas. The patient is not nervous/anxious and is not hyperactive. /68   Pulse 72   Ht 5' 4\" (1.626 m)   Wt 178 lb 9.6 oz (81 kg)   SpO2 98%   BMI 30.66 kg/m²   Physical Exam  Vitals and nursing note reviewed. Constitutional:       General: She is not in acute distress. Appearance: Normal appearance. She is well-developed. She is not ill-appearing, toxic-appearing or diaphoretic. HENT:      Head: Normocephalic and atraumatic. Right Ear: Tympanic membrane, ear canal and external ear normal. There is no impacted cerumen. Left Ear: Tympanic membrane, ear canal and external ear normal. There is no impacted cerumen. Nose: Nose normal. No congestion or rhinorrhea. Mouth/Throat:      Mouth: Mucous membranes are moist.      Pharynx: Oropharynx is clear. No oropharyngeal exudate or posterior oropharyngeal erythema. Eyes:      General: No scleral icterus. Right eye: No discharge. Left eye: No discharge. Extraocular Movements: Extraocular movements intact. Conjunctiva/sclera: Conjunctivae normal.      Pupils: Pupils are equal, round, and reactive to light. Neck:      Thyroid: No thyromegaly. Vascular: No carotid bruit or JVD. Trachea: No tracheal deviation. Cardiovascular:      Rate and Rhythm: Normal rate and regular rhythm. Pulses: Normal pulses. Heart sounds: Normal heart sounds.  No murmur heard.  No friction rub. No gallop. Pulmonary:      Effort: Pulmonary effort is normal. No respiratory distress. Breath sounds: Normal breath sounds. No stridor. No wheezing, rhonchi or rales. Chest:      Chest wall: No tenderness. Abdominal:      General: Bowel sounds are normal. There is no distension. Palpations: Abdomen is soft. There is no mass. Tenderness: There is no abdominal tenderness. There is no right CVA tenderness, left CVA tenderness, guarding or rebound. Hernia: No hernia is present. Musculoskeletal:         General: No swelling, tenderness, deformity or signs of injury. Normal range of motion. Cervical back: Normal range of motion and neck supple. No rigidity. No muscular tenderness. Right lower leg: No edema. Left lower leg: No edema. Lymphadenopathy:      Cervical: No cervical adenopathy. Skin:     General: Skin is warm and dry. Coloration: Skin is not jaundiced or pale. Findings: No bruising, erythema, lesion or rash. Neurological:      General: No focal deficit present. Mental Status: She is alert and oriented to person, place, and time. Mental status is at baseline. Cranial Nerves: No cranial nerve deficit. Motor: No weakness or abnormal muscle tone. Coordination: Coordination normal.      Gait: Gait normal.      Deep Tendon Reflexes: Reflexes are normal and symmetric. Reflexes normal.   Psychiatric:         Mood and Affect: Mood normal.         Behavior: Behavior normal.         Thought Content: Thought content normal.         Judgment: Judgment normal.         1. Primary hypertension    2. Vitamin D deficiency    3. Sebaceous cyst    4. Bad headache    5. Gastroesophageal reflux disease without esophagitis        ASSESSMENT/PLAN:    56-year-old woman here for follow-up    1. Vitamin D deficiency: Continue vitamin D supplementation    2. Hypertension/headaches: Much improved.   Headaches have improved as her blood pressure has improved    3. Acid reflux: Stable    4. Sebaceous cyst: Refer to general surgery    Konstantin Post was seen today for 1 month follow-up. Diagnoses and all orders for this visit:    Primary hypertension  -     CBC Auto Differential; Future  -     Comprehensive Metabolic Panel; Future  -     Lipid Panel; Future  -     TSH without Reflex; Future  -     Vitamin D 25 Hydroxy; Future    Vitamin D deficiency  -     Vitamin D 25 Hydroxy; Future    Sebaceous cyst  -     [facility]    Bad headache    Gastroesophageal reflux disease without esophagitis          Return in about 6 months (around 7/26/2022).      Orders Placed This Encounter   Procedures    CBC Auto Differential     Fast 12 hours     Standing Status:   Future     Standing Expiration Date:   1/26/2023    Comprehensive Metabolic Panel     Fasting 12 hours     Standing Status:   Future     Standing Expiration Date:   1/26/2023    Lipid Panel     Standing Status:   Future     Standing Expiration Date:   1/26/2023     Order Specific Question:   Is Patient Fasting?/# of Hours     Answer:   12    TSH without Reflex     Fast 12 hours     Standing Status:   Future     Standing Expiration Date:   1/26/2023    Vitamin D 25 Hydroxy     Standing Status:   Future     Standing Expiration Date:   1/26/2023   [facility]     Referral Priority:   Routine     Referral Type:   Eval and Treat     Referral Reason:   Specialty Services Required     Requested Specialty:   General Surgery     Number of Visits Requested:   Melissa Antoine MD

## 2022-01-29 ENCOUNTER — TELEPHONE (OUTPATIENT)
Dept: INTERNAL MEDICINE | Age: 72
End: 2022-01-29

## 2022-01-29 ASSESSMENT — ENCOUNTER SYMPTOMS
ABDOMINAL DISTENTION: 0
CHOKING: 0
VOMITING: 0
DIARRHEA: 0
RHINORRHEA: 0
SORE THROAT: 0
WHEEZING: 0
EYE REDNESS: 0
TROUBLE SWALLOWING: 0
COLOR CHANGE: 0
CONSTIPATION: 0
SINUS PRESSURE: 0
SHORTNESS OF BREATH: 0
BLOOD IN STOOL: 0
RECTAL PAIN: 0
PHOTOPHOBIA: 0
COUGH: 0
EYE ITCHING: 0
NAUSEA: 0
BACK PAIN: 0
CHEST TIGHTNESS: 0
VOICE CHANGE: 0
EYE DISCHARGE: 0
ABDOMINAL PAIN: 0
FACIAL SWELLING: 0
EYE PAIN: 0
ANAL BLEEDING: 0

## 2022-02-09 ENCOUNTER — OFFICE VISIT (OUTPATIENT)
Dept: SURGERY | Age: 72
End: 2022-02-09
Payer: MEDICARE

## 2022-02-09 VITALS
SYSTOLIC BLOOD PRESSURE: 124 MMHG | TEMPERATURE: 99 F | HEIGHT: 64 IN | BODY MASS INDEX: 30.73 KG/M2 | DIASTOLIC BLOOD PRESSURE: 74 MMHG | WEIGHT: 180 LBS

## 2022-02-09 DIAGNOSIS — L72.9 FOLLICULAR CYST OF SKIN: ICD-10-CM

## 2022-02-09 DIAGNOSIS — Z01.818 PRE-OP TESTING: Primary | ICD-10-CM

## 2022-02-09 PROCEDURE — G8417 CALC BMI ABV UP PARAM F/U: HCPCS | Performed by: SURGERY

## 2022-02-09 PROCEDURE — G8427 DOCREV CUR MEDS BY ELIG CLIN: HCPCS | Performed by: SURGERY

## 2022-02-09 PROCEDURE — G8482 FLU IMMUNIZE ORDER/ADMIN: HCPCS | Performed by: SURGERY

## 2022-02-09 PROCEDURE — 1123F ACP DISCUSS/DSCN MKR DOCD: CPT | Performed by: SURGERY

## 2022-02-09 PROCEDURE — 3017F COLORECTAL CA SCREEN DOC REV: CPT | Performed by: SURGERY

## 2022-02-09 PROCEDURE — G8400 PT W/DXA NO RESULTS DOC: HCPCS | Performed by: SURGERY

## 2022-02-09 PROCEDURE — 99202 OFFICE O/P NEW SF 15 MIN: CPT | Performed by: SURGERY

## 2022-02-09 PROCEDURE — 1036F TOBACCO NON-USER: CPT | Performed by: SURGERY

## 2022-02-09 PROCEDURE — 4040F PNEUMOC VAC/ADMIN/RCVD: CPT | Performed by: SURGERY

## 2022-02-09 PROCEDURE — 1090F PRES/ABSN URINE INCON ASSESS: CPT | Performed by: SURGERY

## 2022-02-09 RX ORDER — SODIUM CHLORIDE 9 MG/ML
25 INJECTION, SOLUTION INTRAVENOUS PRN
Status: CANCELLED | OUTPATIENT
Start: 2022-02-09

## 2022-02-09 RX ORDER — SODIUM CHLORIDE 0.9 % (FLUSH) 0.9 %
10 SYRINGE (ML) INJECTION EVERY 12 HOURS SCHEDULED
Status: CANCELLED | OUTPATIENT
Start: 2022-02-09

## 2022-02-09 RX ORDER — SODIUM CHLORIDE, SODIUM LACTATE, POTASSIUM CHLORIDE, CALCIUM CHLORIDE 600; 310; 30; 20 MG/100ML; MG/100ML; MG/100ML; MG/100ML
INJECTION, SOLUTION INTRAVENOUS CONTINUOUS
Status: CANCELLED | OUTPATIENT
Start: 2022-02-09

## 2022-02-09 RX ORDER — SODIUM CHLORIDE 0.9 % (FLUSH) 0.9 %
10 SYRINGE (ML) INJECTION PRN
Status: CANCELLED | OUTPATIENT
Start: 2022-02-09

## 2022-02-09 ASSESSMENT — ENCOUNTER SYMPTOMS
SORE THROAT: 0
NAUSEA: 0
BACK PAIN: 1
ABDOMINAL PAIN: 0
CHEST TIGHTNESS: 0
VOMITING: 0
SHORTNESS OF BREATH: 0
EYE REDNESS: 0
DIARRHEA: 0
ABDOMINAL DISTENTION: 0
CONSTIPATION: 0
EYE PAIN: 0
COLOR CHANGE: 0
COUGH: 0

## 2022-02-09 NOTE — LETTER
SCHEDULING INSTRUCTIONS:     Patient: Ember Hermosillo  : 1950    Hospital: [facility]    Admitting Physician:  Dr. Hakeem Calvin    Diagnosis: Right Groin Follicular Cyst    Procedure: Excision Right Groin Follicular Cyst    ALLOW ADDITIONAL 30 MINS FOR TURNOVER EXCEPT FOR PHYSICIAN'S BOUNCE DAY    Anesthesia: MAC    Admission:Outpatient     Date: 2/15/2022           NOT TO BE USED OUTSIDE OF THE OFFICE

## 2022-02-09 NOTE — PROGRESS NOTES
SUBJECTIVE:  Ms. Desmond Stone is a 66 y.o. female who presents today with a skin lesion to her right groin. She states at times it will be enlarged and tender. Right now it is small, but it comes and goes. It hurts to push on. It has been present since 2021. She would like for it to be removed. Past Medical History:   Diagnosis Date    GERD (gastroesophageal reflux disease)     Hypertension     Other hemorrhoids      Past Surgical History:   Procedure Laterality Date    BLADDER SURGERY  2011    bladder sling, vaginal prolaspe, rectocele, cystocele, enterocele    BREAST SURGERY Left 2007    duct removed.  CYST REMOVAL Left 2016    breast    HYSTERECTOMY  1976    HYSTERECTOMY, VAGINAL      OVARY REMOVAL Bilateral     age 31    RECTOCELE REPAIR  2011    TONSILLECTOMY  1968    US BREAST FINE NEEDLE ASPIRATION Left     benign     Current Outpatient Medications   Medication Sig Dispense Refill    ascorbic acid (VITAMIN C) 500 MG tablet Take 500 mg by mouth daily      aspirin 81 MG chewable tablet Take 81 mg by mouth daily      Biotin 5 MG CAPS Take by mouth      calcium carbonate 1500 (600 Ca) MG TABS tablet Take 600 mg by mouth daily      vitamin D (CHOLECALCIFEROL) 25 MCG (1000 UT) TABS tablet Take 1,000 Units by mouth daily      Cyanocobalamin 1000 MCG CAPS Take by mouth      metoprolol succinate (TOPROL XL) 100 MG extended release tablet Take 0.5 tablets by mouth 2 times daily 90 tablet 1    amLODIPine (NORVASC) 10 MG tablet Take 0.5 tablets by mouth 2 times daily 90 tablet 3    pantoprazole (PROTONIX) 40 MG tablet Take 1 tablet by mouth daily 90 tablet 0     No current facility-administered medications for this visit.      Allergies: Tegaderm ag mesh [silver], Chlorhexidine, Sulfa antibiotics, and Codeine    Family History   Problem Relation Age of Onset    Dementia Mother     Other Father         digestive system,  in MVA    Other Sister         covid    Cancer Brother         1993-lung    Cancer Brother         prostate       Social History     Tobacco Use    Smoking status: Never Smoker    Smokeless tobacco: Never Used   Substance Use Topics    Alcohol use: Never       Review of Systems   Constitutional: Negative for fatigue, fever and unexpected weight change. HENT: Positive for hearing loss. Negative for nosebleeds and sore throat. Eyes: Negative for pain, redness and visual disturbance. Respiratory: Negative for cough, chest tightness and shortness of breath. Cardiovascular: Positive for leg swelling. Negative for chest pain and palpitations. Gastrointestinal: Negative for abdominal distention, abdominal pain, constipation, diarrhea, nausea and vomiting. Endocrine: Negative for cold intolerance, heat intolerance and polydipsia. Genitourinary: Negative for difficulty urinating, frequency and urgency. Musculoskeletal: Positive for arthralgias and back pain. Negative for joint swelling and neck pain. Skin: Negative for color change, rash and wound. Allergic/Immunologic: Negative for environmental allergies and food allergies. Neurological: Negative for seizures, light-headedness and headaches. Hematological: Negative for adenopathy. Does not bruise/bleed easily. Psychiatric/Behavioral: Negative for confusion, sleep disturbance and suicidal ideas. OBJECTIVE:  /74 (Site: Right Upper Arm, Position: Sitting, Cuff Size: Large Adult)   Temp 99 °F (37.2 °C) (Temporal)   Ht 5' 4\" (1.626 m)   Wt 180 lb (81.6 kg)   BMI 30.90 kg/m²   Physical Exam  Vitals reviewed. Constitutional:       Appearance: She is well-developed. HENT:      Head: Normocephalic and atraumatic. Eyes:      Pupils: Pupils are equal, round, and reactive to light. Cardiovascular:      Rate and Rhythm: Normal rate and regular rhythm. Heart sounds: Normal heart sounds.    Pulmonary:      Effort: Pulmonary effort is normal.      Breath sounds: Normal breath sounds. No wheezing or rales. Abdominal:      General: Bowel sounds are normal. There is no distension. Palpations: Abdomen is soft. Tenderness: There is no abdominal tenderness. There is no guarding or rebound. Musculoskeletal:         General: Normal range of motion. Cervical back: Normal range of motion. Lymphadenopathy:      Cervical: No cervical adenopathy. Skin:     General: Skin is warm and dry. Comments: Right groin follicular cyst, no-erythema, no discharge, non-tender. Neurological:      Mental Status: She is alert and oriented to person, place, and time. Deep Tendon Reflexes: Reflexes are normal and symmetric. Psychiatric:         Behavior: Behavior normal.         Thought Content: Thought content normal.         Judgment: Judgment normal.         ASSESSMENT:   Diagnosis Orders   1. Follicular cyst of skin         PLAN:  No orders of the defined types were placed in this encounter. No orders of the defined types were placed in this encounter. The risks, benefits, and alternatives were discussed with the pt. She is willing to accept the risks and proceed with a excision of her right groin follicular cyst. The surgical risks included but not limited to bleeding, infection, recurrence, etc. The anesthetic risks included heart attacks, strokes, pneumonia, phlebitis, etc.  She is willing to accept all risks and proceed with surgery. No guarantees have been given. Return for Post-operative Visit.     DO Lopez 0/3/6721 4:19 PM

## 2022-02-09 NOTE — H&P (VIEW-ONLY)
[facility] Surgery History and Physical   SUBJECTIVE:  Ms. Paola Augustin is a 66 y.o. female who presents today with a skin lesion to her right groin. She states at times it will be enlarged and tender. Right now it is small, but it comes and goes. It hurts to push on. It has been present since 2021. She would like for it to be removed. Past Medical History:   Diagnosis Date    GERD (gastroesophageal reflux disease)     Hypertension     Other hemorrhoids      Past Surgical History:   Procedure Laterality Date    BLADDER SURGERY  2011    bladder sling, vaginal prolaspe, rectocele, cystocele, enterocele    BREAST SURGERY Left 2007    duct removed.  CYST REMOVAL Left 2016    breast    HYSTERECTOMY  1976    HYSTERECTOMY, VAGINAL      OVARY REMOVAL Bilateral 1980    age 31    RECTOCELE REPAIR  2011    TONSILLECTOMY  1968    US BREAST FINE NEEDLE ASPIRATION Left     benign     Current Outpatient Medications   Medication Sig Dispense Refill    ascorbic acid (VITAMIN C) 500 MG tablet Take 500 mg by mouth daily      aspirin 81 MG chewable tablet Take 81 mg by mouth daily      Biotin 5 MG CAPS Take by mouth      calcium carbonate 1500 (600 Ca) MG TABS tablet Take 600 mg by mouth daily      vitamin D (CHOLECALCIFEROL) 25 MCG (1000 UT) TABS tablet Take 1,000 Units by mouth daily      Cyanocobalamin 1000 MCG CAPS Take by mouth      metoprolol succinate (TOPROL XL) 100 MG extended release tablet Take 0.5 tablets by mouth 2 times daily 90 tablet 1    amLODIPine (NORVASC) 10 MG tablet Take 0.5 tablets by mouth 2 times daily 90 tablet 3    pantoprazole (PROTONIX) 40 MG tablet Take 1 tablet by mouth daily 90 tablet 0     No current facility-administered medications for this visit.      Allergies: Tegaderm ag mesh [silver], Chlorhexidine, Sulfa antibiotics, and Codeine    Family History   Problem Relation Age of Onset    Dementia Mother     Other Father         digestive system,  effort is normal.      Breath sounds: Normal breath sounds. No wheezing or rales. Abdominal:      General: Bowel sounds are normal. There is no distension. Palpations: Abdomen is soft. Tenderness: There is no abdominal tenderness. There is no guarding or rebound. Musculoskeletal:         General: Normal range of motion. Cervical back: Normal range of motion. Lymphadenopathy:      Cervical: No cervical adenopathy. Skin:     General: Skin is warm and dry. Comments: Right groin follicular cyst, no-erythema, no discharge, non-tender. Neurological:      Mental Status: She is alert and oriented to person, place, and time. Deep Tendon Reflexes: Reflexes are normal and symmetric. Psychiatric:         Behavior: Behavior normal.         Thought Content: Thought content normal.         Judgment: Judgment normal.         ASSESSMENT:   Diagnosis Orders   1. Follicular cyst of skin         PLAN:  No orders of the defined types were placed in this encounter. No orders of the defined types were placed in this encounter. The risks, benefits, and alternatives were discussed with the pt. She is willing to accept the risks and proceed with a excision of her right groin follicular cyst. The surgical risks included but not limited to bleeding, infection, recurrence, etc. The anesthetic risks included heart attacks, strokes, pneumonia, phlebitis, etc.  She is willing to accept all risks and proceed with surgery. No guarantees have been given. Return for Post-operative Visit.     DO Lopez 7/9/3113 4:19 PM

## 2022-02-09 NOTE — H&P
111 [facility] Surgery History and Physical   SUBJECTIVE:  Ms. Ivette Aggarwal is a 66 y.o. female who presents today with a skin lesion to her right groin. She states at times it will be enlarged and tender. Right now it is small, but it comes and goes. It hurts to push on. It has been present since 2021. She would like for it to be removed. Past Medical History:   Diagnosis Date    GERD (gastroesophageal reflux disease)     Hypertension     Other hemorrhoids      Past Surgical History:   Procedure Laterality Date    BLADDER SURGERY  2011    bladder sling, vaginal prolaspe, rectocele, cystocele, enterocele    BREAST SURGERY Left 2007    duct removed.  CYST REMOVAL Left 2016    breast    HYSTERECTOMY  1976    HYSTERECTOMY, VAGINAL      OVARY REMOVAL Bilateral 1980    age 31    RECTOCELE REPAIR  2011    TONSILLECTOMY  1968    US BREAST FINE NEEDLE ASPIRATION Left     benign     Current Outpatient Medications   Medication Sig Dispense Refill    ascorbic acid (VITAMIN C) 500 MG tablet Take 500 mg by mouth daily      aspirin 81 MG chewable tablet Take 81 mg by mouth daily      Biotin 5 MG CAPS Take by mouth      calcium carbonate 1500 (600 Ca) MG TABS tablet Take 600 mg by mouth daily      vitamin D (CHOLECALCIFEROL) 25 MCG (1000 UT) TABS tablet Take 1,000 Units by mouth daily      Cyanocobalamin 1000 MCG CAPS Take by mouth      metoprolol succinate (TOPROL XL) 100 MG extended release tablet Take 0.5 tablets by mouth 2 times daily 90 tablet 1    amLODIPine (NORVASC) 10 MG tablet Take 0.5 tablets by mouth 2 times daily 90 tablet 3    pantoprazole (PROTONIX) 40 MG tablet Take 1 tablet by mouth daily 90 tablet 0     No current facility-administered medications for this visit.      Allergies: Tegaderm ag mesh [silver], Chlorhexidine, Sulfa antibiotics, and Codeine    Family History   Problem Relation Age of Onset    Dementia Mother     Other Father         digestive system,  in MVA    Other Sister         covid    Cancer Brother         1993-lung    Cancer Brother         prostate       Social History     Tobacco Use    Smoking status: Never Smoker    Smokeless tobacco: Never Used   Substance Use Topics    Alcohol use: Never       Review of Systems   Constitutional: Negative for fatigue, fever and unexpected weight change. HENT: Positive for hearing loss. Negative for nosebleeds and sore throat. Eyes: Negative for pain, redness and visual disturbance. Respiratory: Negative for cough, chest tightness and shortness of breath. Cardiovascular: Positive for leg swelling. Negative for chest pain and palpitations. Gastrointestinal: Negative for abdominal distention, abdominal pain, constipation, diarrhea, nausea and vomiting. Endocrine: Negative for cold intolerance, heat intolerance and polydipsia. Genitourinary: Negative for difficulty urinating, frequency and urgency. Musculoskeletal: Positive for arthralgias and back pain. Negative for joint swelling and neck pain. Skin: Negative for color change, rash and wound. Allergic/Immunologic: Negative for environmental allergies and food allergies. Neurological: Negative for seizures, light-headedness and headaches. Hematological: Negative for adenopathy. Does not bruise/bleed easily. Psychiatric/Behavioral: Negative for confusion, sleep disturbance and suicidal ideas. OBJECTIVE:  /74 (Site: Right Upper Arm, Position: Sitting, Cuff Size: Large Adult)   Temp 99 °F (37.2 °C) (Temporal)   Ht 5' 4\" (1.626 m)   Wt 180 lb (81.6 kg)   BMI 30.90 kg/m²   Physical Exam  Vitals reviewed. Constitutional:       Appearance: She is well-developed. HENT:      Head: Normocephalic and atraumatic. Eyes:      Pupils: Pupils are equal, round, and reactive to light. Cardiovascular:      Rate and Rhythm: Normal rate and regular rhythm. Heart sounds: Normal heart sounds.    Pulmonary:      Effort: Pulmonary effort is normal.      Breath sounds: Normal breath sounds. No wheezing or rales. Abdominal:      General: Bowel sounds are normal. There is no distension. Palpations: Abdomen is soft. Tenderness: There is no abdominal tenderness. There is no guarding or rebound. Musculoskeletal:         General: Normal range of motion. Cervical back: Normal range of motion. Lymphadenopathy:      Cervical: No cervical adenopathy. Skin:     General: Skin is warm and dry. Comments: Right groin follicular cyst, no-erythema, no discharge, non-tender. Neurological:      Mental Status: She is alert and oriented to person, place, and time. Deep Tendon Reflexes: Reflexes are normal and symmetric. Psychiatric:         Behavior: Behavior normal.         Thought Content: Thought content normal.         Judgment: Judgment normal.         ASSESSMENT:   Diagnosis Orders   1. Follicular cyst of skin         PLAN:  No orders of the defined types were placed in this encounter. No orders of the defined types were placed in this encounter. The risks, benefits, and alternatives were discussed with the pt. She is willing to accept the risks and proceed with a excision of her right groin follicular cyst. The surgical risks included but not limited to bleeding, infection, recurrence, etc. The anesthetic risks included heart attacks, strokes, pneumonia, phlebitis, etc.  She is willing to accept all risks and proceed with surgery. No guarantees have been given. Return for Post-operative Visit.     DO Lopez 4/4/9437 4:19 PM

## 2022-02-10 ENCOUNTER — ANESTHESIA EVENT (OUTPATIENT)
Dept: OPERATING ROOM | Age: 72
End: 2022-02-10

## 2022-02-14 ENCOUNTER — HOSPITAL ENCOUNTER (OUTPATIENT)
Dept: NON INVASIVE DIAGNOSTICS | Age: 72
Discharge: HOME | End: 2022-02-14
Payer: MEDICARE

## 2022-02-14 DIAGNOSIS — Z01.818 PRE-OP TESTING: ICD-10-CM

## 2022-02-14 DIAGNOSIS — Z11.52 ENCOUNTER FOR SCREENING FOR COVID-19: Primary | ICD-10-CM

## 2022-02-14 LAB
EKG P AXIS: 64 DEGREES
EKG P-R INTERVAL: 180 MS
EKG Q-T INTERVAL: 398 MS
EKG QRS DURATION: 82 MS
EKG QTC CALCULATION (BAZETT): 393 MS
EKG T AXIS: 42 DEGREES
SARS-COV-2, PCR: NOT DETECTED

## 2022-02-14 PROCEDURE — 93005 ELECTROCARDIOGRAM TRACING: CPT | Performed by: ANESTHESIOLOGY

## 2022-02-15 ENCOUNTER — HOSPITAL ENCOUNTER (OUTPATIENT)
Age: 72
Setting detail: OUTPATIENT SURGERY
Discharge: HOME | End: 2022-02-15
Attending: SURGERY | Admitting: SURGERY
Payer: MEDICARE

## 2022-02-15 ENCOUNTER — ANESTHESIA (OUTPATIENT)
Dept: OPERATING ROOM | Age: 72
End: 2022-02-15

## 2022-02-15 ENCOUNTER — HOSPITAL ENCOUNTER (OUTPATIENT)
Age: 72
Setting detail: SPECIMEN
Discharge: HOME | End: 2022-02-15
Payer: MEDICARE

## 2022-02-15 VITALS
SYSTOLIC BLOOD PRESSURE: 141 MMHG | HEART RATE: 60 BPM | HEIGHT: 64 IN | DIASTOLIC BLOOD PRESSURE: 61 MMHG | RESPIRATION RATE: 16 BRPM | BODY MASS INDEX: 30.39 KG/M2 | TEMPERATURE: 97.2 F | OXYGEN SATURATION: 95 % | WEIGHT: 178 LBS

## 2022-02-15 VITALS
TEMPERATURE: 96 F | RESPIRATION RATE: 1 BRPM | SYSTOLIC BLOOD PRESSURE: 90 MMHG | OXYGEN SATURATION: 98 % | DIASTOLIC BLOOD PRESSURE: 51 MMHG

## 2022-02-15 DIAGNOSIS — Z01.818 PRE-OP TESTING: Primary | ICD-10-CM

## 2022-02-15 PROBLEM — L72.9 FOLLICULAR CYST OF SKIN: Chronic | Status: ACTIVE | Noted: 2022-02-09

## 2022-02-15 PROCEDURE — 11401 EXC TR-EXT B9+MARG 0.6-1 CM: CPT | Performed by: SURGERY

## 2022-02-15 PROCEDURE — 12031 INTMD RPR S/A/T/EXT 2.5 CM/<: CPT | Performed by: SURGERY

## 2022-02-15 PROCEDURE — 88304 TISSUE EXAM BY PATHOLOGIST: CPT

## 2022-02-15 PROCEDURE — 12031 INTMD RPR S/A/T/EXT 2.5 CM/<: CPT

## 2022-02-15 PROCEDURE — 11402 EXC TR-EXT B9+MARG 1.1-2 CM: CPT

## 2022-02-15 RX ORDER — SODIUM CHLORIDE 9 MG/ML
25 INJECTION, SOLUTION INTRAVENOUS PRN
Status: DISCONTINUED | OUTPATIENT
Start: 2022-02-15 | End: 2022-02-15 | Stop reason: HOSPADM

## 2022-02-15 RX ORDER — HYDRALAZINE HYDROCHLORIDE 20 MG/ML
5 INJECTION INTRAMUSCULAR; INTRAVENOUS EVERY 10 MIN PRN
Status: DISCONTINUED | OUTPATIENT
Start: 2022-02-15 | End: 2022-02-15 | Stop reason: HOSPADM

## 2022-02-15 RX ORDER — FENTANYL CITRATE 50 UG/ML
25 INJECTION, SOLUTION INTRAMUSCULAR; INTRAVENOUS EVERY 5 MIN PRN
Status: DISCONTINUED | OUTPATIENT
Start: 2022-02-15 | End: 2022-02-15 | Stop reason: HOSPADM

## 2022-02-15 RX ORDER — PROPOFOL 10 MG/ML
INJECTION, EMULSION INTRAVENOUS PRN
Status: DISCONTINUED | OUTPATIENT
Start: 2022-02-15 | End: 2022-02-15 | Stop reason: SDUPTHER

## 2022-02-15 RX ORDER — MORPHINE SULFATE 10 MG/ML
1 INJECTION, SOLUTION INTRAMUSCULAR; INTRAVENOUS EVERY 5 MIN PRN
Status: DISCONTINUED | OUTPATIENT
Start: 2022-02-15 | End: 2022-02-15 | Stop reason: HOSPADM

## 2022-02-15 RX ORDER — FENTANYL CITRATE 50 UG/ML
INJECTION, SOLUTION INTRAMUSCULAR; INTRAVENOUS PRN
Status: DISCONTINUED | OUTPATIENT
Start: 2022-02-15 | End: 2022-02-15 | Stop reason: SDUPTHER

## 2022-02-15 RX ORDER — ONDANSETRON 2 MG/ML
4 INJECTION INTRAMUSCULAR; INTRAVENOUS
Status: DISCONTINUED | OUTPATIENT
Start: 2022-02-15 | End: 2022-02-15 | Stop reason: HOSPADM

## 2022-02-15 RX ORDER — ONDANSETRON 2 MG/ML
INJECTION INTRAMUSCULAR; INTRAVENOUS PRN
Status: DISCONTINUED | OUTPATIENT
Start: 2022-02-15 | End: 2022-02-15 | Stop reason: SDUPTHER

## 2022-02-15 RX ORDER — 0.9 % SODIUM CHLORIDE 0.9 %
500 INTRAVENOUS SOLUTION INTRAVENOUS
Status: DISCONTINUED | OUTPATIENT
Start: 2022-02-15 | End: 2022-02-15 | Stop reason: HOSPADM

## 2022-02-15 RX ORDER — DEXAMETHASONE SODIUM PHOSPHATE 4 MG/ML
INJECTION, SOLUTION INTRA-ARTICULAR; INTRALESIONAL; INTRAMUSCULAR; INTRAVENOUS; SOFT TISSUE PRN
Status: DISCONTINUED | OUTPATIENT
Start: 2022-02-15 | End: 2022-02-15 | Stop reason: SDUPTHER

## 2022-02-15 RX ORDER — LABETALOL HYDROCHLORIDE 5 MG/ML
5 INJECTION, SOLUTION INTRAVENOUS EVERY 10 MIN PRN
Status: DISCONTINUED | OUTPATIENT
Start: 2022-02-15 | End: 2022-02-15 | Stop reason: HOSPADM

## 2022-02-15 RX ORDER — SODIUM CHLORIDE 0.9 % (FLUSH) 0.9 %
10 SYRINGE (ML) INJECTION EVERY 12 HOURS SCHEDULED
Status: DISCONTINUED | OUTPATIENT
Start: 2022-02-15 | End: 2022-02-15 | Stop reason: HOSPADM

## 2022-02-15 RX ORDER — LIDOCAINE HYDROCHLORIDE 10 MG/ML
INJECTION, SOLUTION EPIDURAL; INFILTRATION; INTRACAUDAL; PERINEURAL PRN
Status: DISCONTINUED | OUTPATIENT
Start: 2022-02-15 | End: 2022-02-15 | Stop reason: SDUPTHER

## 2022-02-15 RX ORDER — SODIUM CHLORIDE, SODIUM LACTATE, POTASSIUM CHLORIDE, CALCIUM CHLORIDE 600; 310; 30; 20 MG/100ML; MG/100ML; MG/100ML; MG/100ML
INJECTION, SOLUTION INTRAVENOUS CONTINUOUS
Status: DISCONTINUED | OUTPATIENT
Start: 2022-02-15 | End: 2022-02-15 | Stop reason: HOSPADM

## 2022-02-15 RX ORDER — DIPHENHYDRAMINE HYDROCHLORIDE 50 MG/ML
12.5 INJECTION INTRAMUSCULAR; INTRAVENOUS
Status: DISCONTINUED | OUTPATIENT
Start: 2022-02-15 | End: 2022-02-15 | Stop reason: HOSPADM

## 2022-02-15 RX ORDER — BUPIVACAINE HYDROCHLORIDE 2.5 MG/ML
INJECTION, SOLUTION EPIDURAL; INFILTRATION; INTRACAUDAL PRN
Status: DISCONTINUED | OUTPATIENT
Start: 2022-02-15 | End: 2022-02-15 | Stop reason: ALTCHOICE

## 2022-02-15 RX ORDER — OXYCODONE HYDROCHLORIDE AND ACETAMINOPHEN 5; 325 MG/1; MG/1
1 TABLET ORAL
Status: DISCONTINUED | OUTPATIENT
Start: 2022-02-15 | End: 2022-02-15 | Stop reason: HOSPADM

## 2022-02-15 RX ORDER — SODIUM CHLORIDE 0.9 % (FLUSH) 0.9 %
10 SYRINGE (ML) INJECTION PRN
Status: DISCONTINUED | OUTPATIENT
Start: 2022-02-15 | End: 2022-02-15 | Stop reason: HOSPADM

## 2022-02-15 RX ORDER — MEPERIDINE HYDROCHLORIDE 25 MG/ML
12.5 INJECTION INTRAMUSCULAR; INTRAVENOUS; SUBCUTANEOUS EVERY 5 MIN PRN
Status: DISCONTINUED | OUTPATIENT
Start: 2022-02-15 | End: 2022-02-15 | Stop reason: HOSPADM

## 2022-02-15 RX ORDER — PROMETHAZINE HYDROCHLORIDE 25 MG/ML
6.25 INJECTION, SOLUTION INTRAMUSCULAR; INTRAVENOUS
Status: DISCONTINUED | OUTPATIENT
Start: 2022-02-15 | End: 2022-02-15 | Stop reason: HOSPADM

## 2022-02-15 RX ADMIN — SODIUM CHLORIDE, SODIUM LACTATE, POTASSIUM CHLORIDE, CALCIUM CHLORIDE: 600; 310; 30; 20 INJECTION, SOLUTION INTRAVENOUS at 08:04

## 2022-02-15 RX ADMIN — PROPOFOL 150 MG: 10 INJECTION, EMULSION INTRAVENOUS at 08:51

## 2022-02-15 RX ADMIN — FENTANYL CITRATE 50 MCG: 50 INJECTION, SOLUTION INTRAMUSCULAR; INTRAVENOUS at 08:51

## 2022-02-15 RX ADMIN — ONDANSETRON 4 MG: 2 INJECTION INTRAMUSCULAR; INTRAVENOUS at 09:07

## 2022-02-15 RX ADMIN — DEXAMETHASONE SODIUM PHOSPHATE 4 MG: 4 INJECTION, SOLUTION INTRA-ARTICULAR; INTRALESIONAL; INTRAMUSCULAR; INTRAVENOUS; SOFT TISSUE at 08:55

## 2022-02-15 RX ADMIN — LIDOCAINE HYDROCHLORIDE 30 MG: 10 INJECTION, SOLUTION EPIDURAL; INFILTRATION; INTRACAUDAL; PERINEURAL at 08:51

## 2022-02-15 ASSESSMENT — PAIN SCALES - GENERAL: PAINLEVEL_OUTOF10: 0

## 2022-02-15 NOTE — DISCHARGE INSTR - ACTIVITY
No heavy lifting. May shower tomorrow. Do not soak in tub for 2 weeks. Allow the glue to fall off on its own.

## 2022-02-15 NOTE — ANESTHESIA POSTPROCEDURE EVALUATION
Department of Anesthesiology  Postprocedure Note    Patient: Duc Martin  MRN: [id number]  YOB: 1950  Date of evaluation: 2/15/2022  Time:  9:22 AM     Procedure Summary     Date: 02/15/22 Room / Location: [address]    Anesthesia Start: [facility] Anesthesia Stop: 1947    Procedure: EXCISION RIGHT GROIN FOLLICULAR CYST (Right Groin) Diagnosis: (RIGHT GROIN FOLLICULAR CYST)    Surgeons: Charles Brandon DO Responsible Provider: ASHER Mast CRNA    Anesthesia Type: general ASA Status: 2          Anesthesia Type: general    Esme Phase I:      Esme Phase II:      Last vitals: Reviewed and per EMR flowsheets.        Anesthesia Post Evaluation    Patient location during evaluation: bedside  Patient participation: complete - patient participated  Level of consciousness: awake  Pain score: 0  Airway patency: patent  Nausea & Vomiting: no nausea and no vomiting  Complications: no  Cardiovascular status: hemodynamically stable  Respiratory status: acceptable, room air and spontaneous ventilation  Hydration status: euvolemic  Comments: Temp 97.2F

## 2022-02-15 NOTE — ANESTHESIA PRE PROCEDURE
10 mL  10 mL IntraVENous PRN Neha Aparicio DO           Allergies: Allergies   Allergen Reactions    Tegaderm Ag Mesh [Silver] Itching    Chlorhexidine Itching     Patient says she's not allergic to this    Sulfa Antibiotics Swelling    Codeine Nausea And Vomiting       Problem List:    Patient Active Problem List   Diagnosis Code    Follicular cyst of skin L72.9       Past Medical History:        Diagnosis Date    GERD (gastroesophageal reflux disease)     Hypertension     Other hemorrhoids        Past Surgical History:        Procedure Laterality Date    BLADDER SURGERY  09/2011    bladder sling, vaginal prolaspe, rectocele, cystocele, enterocele    BREAST SURGERY Left 12/2007    duct removed.     CYST REMOVAL Left 07/2016    breast    HYSTERECTOMY  1976    HYSTERECTOMY, VAGINAL      OVARY REMOVAL Bilateral 1980    age 31    RECTOCELE REPAIR  09/2011    TONSILLECTOMY  1968    US BREAST FINE NEEDLE ASPIRATION Left 2016    benign       Social History:    Social History     Tobacco Use    Smoking status: Never Smoker    Smokeless tobacco: Never Used   Substance Use Topics    Alcohol use: Never                                Counseling given: Not Answered      Vital Signs (Current):   Vitals:    02/10/22 1446 02/15/22 0748   BP:  120/73   Pulse:  62   Resp:  17   Temp:  97.5 °F (36.4 °C)   TempSrc:  Temporal   SpO2:  98%   Weight: 178 lb (80.7 kg) 178 lb (80.7 kg)   Height: 5' 4\" (1.626 m) 5' 4\" (1.626 m)                                              BP Readings from Last 3 Encounters:   02/15/22 120/73   02/09/22 124/74   01/26/22 112/68       NPO Status: Time of last liquid consumption: 0630 (sip with pills)                        Time of last solid consumption: 1800                        Date of last liquid consumption: 02/15/22                        Date of last solid food consumption: 02/14/22    BMI:   Wt Readings from Last 3 Encounters:   02/15/22 178 lb (80.7 kg)   02/09/22 180 lb (81.6 kg)   01/26/22 178 lb 9.6 oz (81 kg)     Body mass index is 30.55 kg/m². CBC:   Lab Results   Component Value Date    WBC 7.8 12/17/2021    RBC 5.22 12/17/2021    HGB 15.5 12/17/2021    HCT 49.1 12/17/2021    MCV 94.1 12/17/2021    RDW 13.8 12/17/2021     12/17/2021       CMP:   Lab Results   Component Value Date     01/18/2022    K 4.6 01/18/2022     01/18/2022    CO2 29 01/18/2022    BUN 15 01/18/2022    CREATININE 0.7 01/18/2022    GFRAA >59 01/18/2022    LABGLOM >60 01/18/2022    GLUCOSE 87 01/18/2022    PROT 6.6 01/18/2022    CALCIUM 10.0 01/18/2022    BILITOT 0.5 01/18/2022    ALKPHOS 108 01/18/2022    AST 56 01/18/2022    ALT 54 01/18/2022       POC Tests: No results for input(s): POCGLU, POCNA, POCK, POCCL, POCBUN, POCHEMO, POCHCT in the last 72 hours.     Coags: No results found for: PROTIME, INR, APTT    HCG (If Applicable): No results found for: PREGTESTUR, PREGSERUM, HCG, HCGQUANT     ABGs: No results found for: PHART, PO2ART, TJG1CIL, VBB3YRM, BEART, V5KMLTQI     Type & Screen (If Applicable):  No results found for: LABABO, LABRH    Drug/Infectious Status (If Applicable):  No results found for: HIV, HEPCAB    COVID-19 Screening (If Applicable):   Lab Results   Component Value Date    COVID19 Not Detected 02/14/2022           Anesthesia Evaluation  Patient summary reviewed and Nursing notes reviewed  Airway: Mallampati: II  TM distance: >3 FB   Neck ROM: full  Mouth opening: > = 3 FB Dental: normal exam         Pulmonary:Negative Pulmonary ROS and normal exam  breath sounds clear to auscultation                             Cardiovascular:Negative CV ROS  Exercise tolerance: good (>4 METS),   (+) hypertension: moderate,         Rhythm: regular  Rate: normal           Beta Blocker:  Dose within 24 Hrs         Neuro/Psych:   Negative Neuro/Psych ROS              GI/Hepatic/Renal: Neg GI/Hepatic/Renal ROS  (+) GERD: well controlled,           Endo/Other: Negative Endo/Other ROS Abdominal:             Vascular: negative vascular ROS. Other Findings:             Anesthesia Plan      general     ASA 2       Induction: intravenous. MIPS: Postoperative opioids intended and Prophylactic antiemetics administered. Anesthetic plan and risks discussed with patient.                       ASHER Cardenas CRNA   2/15/2022

## 2022-02-15 NOTE — OP NOTE
Operative Note      Patient: Jimmie Martinez  YOB: 1950  MRN: [id number]    Date of Procedure: 2/15/2022    Pre-Op Diagnosis: RIGHT GROIN FOLLICULAR CYST    Post-Op Diagnosis: Same       Procedure(s):  EXCISION RIGHT GROIN FOLLICULAR CYST    Surgeon(s):  Sumi Delgado DO    Assistant:   * No surgical staff found *    Anesthesia: General    Estimated Blood Loss (mL): Minimal    Complications: None    Specimens:   ID Type Source Tests Collected by Time Destination   A : RT GROIN CYST Tissue Groin SURGICAL PATHOLOGY Sumi Delgado DO 9/02/3888 0911        Implants:  * No implants in log *      Drains: * No LDAs found *    Detailed Description of Procedure:   Patient was taken to the main operating room, placed on the operating table in the supine position. After IV Antibiotics were administered, the patient was placed under sedative anesthesia. The right groin and upper thigh was prepped and draped in the usual sterile fashion. A timeout was performed identifying the correct patient, equipment, and antibiotics given. The right groin cyst and its tanvir were seen and a small ellipse incision was marked with a marking pen. The cyst measured 0.25cm by 0.75cm in size. A skin incision was made overlying the markings after 0.25% Marcaine was injected. Electrocautery dissection was used to circumferentially dissect the lesion and it was removed from its attachments. The wound was irrigated and hemostasis was obtained. The wound was closed in multiple layers. The subcutaneous layer was closed with 3-0 Vicryl and the skin closed with 4-0 Monocryl. The area was washed and dried and sterile Dermabond was applied to the wound. Sponge, needle, instrument count correct on 2 occasions. Estimated  intraoperative blood loss 1 mL. Ms. Margarita Stock tolerated her surgery well and she was taken to PACU in  satisfactory condition.           ________________________________  Susanne Lee DO            Electronically signed by Gilberto DO Rosalina on 5/72/5994 at 9:11 AM

## 2022-03-02 ENCOUNTER — OFFICE VISIT (OUTPATIENT)
Dept: SURGERY | Age: 72
End: 2022-03-02

## 2022-03-02 VITALS
DIASTOLIC BLOOD PRESSURE: 78 MMHG | SYSTOLIC BLOOD PRESSURE: 130 MMHG | WEIGHT: 179.4 LBS | TEMPERATURE: 98.5 F | BODY MASS INDEX: 30.63 KG/M2 | HEIGHT: 64 IN

## 2022-03-02 DIAGNOSIS — Z09 POSTOPERATIVE EXAMINATION: Primary | ICD-10-CM

## 2022-03-02 PROCEDURE — 99024 POSTOP FOLLOW-UP VISIT: CPT | Performed by: SURGERY

## 2022-03-02 NOTE — PROGRESS NOTES
Postop Progress Note    Subjective    Agustín Crouch presents to the office for postop follow up 2 weeks s/p excision right groin follicular cyst. She states minimal soreness following surgery. She notes some hemorrhoidal disease with some blood on the tissue when she wipes and some burning pain. She has had rectocele and cystocele repair in the past.     Objective    Vitals:    03/02/22 1022   BP: 130/78   Temp: 98.5 °F (36.9 °C)     General: alert, cooperative and no distress  Incision: healing well. No erythema or drainage. Assessment  Doing well postoperatively. Plan  1. Continue any current medications. Reviewed bowel regimen and she would like to try this for a bit before being fully evaluated for hemorrhoidectomy. 2. Wound care discussed  3. Pt is to increase activities as tolerated  4. Usual diet  5.  Follow up: as needed    Electronically signed by Daria Vinson DO on 9/6/7629 at 10:28 AM

## 2022-04-11 ENCOUNTER — TELEPHONE (OUTPATIENT)
Dept: INTERNAL MEDICINE | Age: 72
End: 2022-04-11

## 2022-04-11 DIAGNOSIS — I10 PRIMARY HYPERTENSION: ICD-10-CM

## 2022-04-11 DIAGNOSIS — I10 PRIMARY HYPERTENSION: Primary | ICD-10-CM

## 2022-04-11 DIAGNOSIS — K21.9 GASTROESOPHAGEAL REFLUX DISEASE WITHOUT ESOPHAGITIS: ICD-10-CM

## 2022-04-11 RX ORDER — METOPROLOL SUCCINATE 100 MG/1
50 TABLET, EXTENDED RELEASE ORAL 2 TIMES DAILY
Qty: 90 TABLET | Refills: 1 | Status: SHIPPED | OUTPATIENT
Start: 2022-04-11 | End: 2022-10-10 | Stop reason: SDUPTHER

## 2022-04-11 RX ORDER — HYDROCHLOROTHIAZIDE 12.5 MG/1
12.5 CAPSULE, GELATIN COATED ORAL EVERY MORNING
Qty: 30 CAPSULE | Refills: 1 | Status: SHIPPED | OUTPATIENT
Start: 2022-04-11

## 2022-04-11 RX ORDER — LOSARTAN POTASSIUM 25 MG/1
25 TABLET ORAL DAILY
Qty: 30 TABLET | Refills: 5 | Status: SHIPPED | OUTPATIENT
Start: 2022-04-11 | End: 2022-04-18 | Stop reason: SDUPTHER

## 2022-04-11 RX ORDER — PANTOPRAZOLE SODIUM 40 MG/1
40 TABLET, DELAYED RELEASE ORAL DAILY
Qty: 90 TABLET | Refills: 1 | Status: SHIPPED | OUTPATIENT
Start: 2022-04-11 | End: 2022-04-19 | Stop reason: SDUPTHER

## 2022-04-11 NOTE — TELEPHONE ENCOUNTER
Pt was notified. She says she can't take Lisinopril due to cough. I have added this to her allergy list. Please advise on alternate med.

## 2022-04-11 NOTE — TELEPHONE ENCOUNTER
Patient states she is currently taking two blood pressure medicines and has had swelling in her lower legs, ankles and feet for 6 weeks? Wants to know if she should be taking a water pill?     Please call and advise

## 2022-04-11 NOTE — TELEPHONE ENCOUNTER
Could be the Norvasc.   Discontinue the Norvasc and start her on lisinopril 10 mg p.o. daily and get her into be seen sometime later this week

## 2022-04-11 NOTE — TELEPHONE ENCOUNTER
We can add HCTZ 12.5 po daily, but if the culprit is the amlodipine, the swelling will resolve without need for a diuretic

## 2022-04-11 NOTE — TELEPHONE ENCOUNTER
Patient's legs, ankles, and feet have been swelling times 6 weeks. Patient stated this has been on and off with the swelling mainly happening at night. Please advise.

## 2022-04-11 NOTE — TELEPHONE ENCOUNTER
Patient calling in for refills on medication    Pantoprazole 40mg  Metoprolol succinate 100mg    [facility] by mail

## 2022-04-11 NOTE — TELEPHONE ENCOUNTER
See other telephone encounter. Amlodipine was discontinued. Losartan ordered. Please advise on water pill.

## 2022-04-18 DIAGNOSIS — I10 PRIMARY HYPERTENSION: ICD-10-CM

## 2022-04-18 RX ORDER — LOSARTAN POTASSIUM 25 MG/1
25 TABLET ORAL DAILY
Qty: 30 TABLET | Refills: 5 | Status: SHIPPED | OUTPATIENT
Start: 2022-04-18 | End: 2022-09-12 | Stop reason: SDUPTHER

## 2022-04-18 NOTE — TELEPHONE ENCOUNTER
Army January called to request a refill on her medication.       Last office visit : 1/26/2022   Next office visit : 7/26/2022     Requested Prescriptions     Pending Prescriptions Disp Refills    losartan (COZAAR) 25 MG tablet 30 tablet 5     Sig: Take 1 tablet by mouth daily            Beverly Hillman MA

## 2022-04-19 DIAGNOSIS — K21.9 GASTROESOPHAGEAL REFLUX DISEASE WITHOUT ESOPHAGITIS: ICD-10-CM

## 2022-04-19 RX ORDER — PANTOPRAZOLE SODIUM 40 MG/1
40 TABLET, DELAYED RELEASE ORAL DAILY
Qty: 90 TABLET | Refills: 1 | Status: SHIPPED | OUTPATIENT
Start: 2022-04-19 | End: 2022-10-10 | Stop reason: SDUPTHER

## 2022-04-19 NOTE — TELEPHONE ENCOUNTER
Isai Tucker called to request a refill on her medication.       Last office visit : 1/26/2022   Next office visit : 7/26/2022     Requested Prescriptions     Pending Prescriptions Disp Refills    pantoprazole (PROTONIX) 40 MG tablet 90 tablet 1     Sig: Take 1 tablet by mouth daily            Kwame Cooper MA

## 2022-07-18 DIAGNOSIS — E55.9 VITAMIN D DEFICIENCY: ICD-10-CM

## 2022-07-18 DIAGNOSIS — I10 PRIMARY HYPERTENSION: ICD-10-CM

## 2022-07-26 ENCOUNTER — TRANSCRIBE ORDERS (OUTPATIENT)
Dept: ADMINISTRATIVE | Facility: HOSPITAL | Age: 72
End: 2022-07-26

## 2022-07-26 ENCOUNTER — OFFICE VISIT (OUTPATIENT)
Dept: INTERNAL MEDICINE | Age: 72
End: 2022-07-26
Payer: MEDICARE

## 2022-07-26 VITALS
WEIGHT: 180.4 LBS | SYSTOLIC BLOOD PRESSURE: 114 MMHG | DIASTOLIC BLOOD PRESSURE: 80 MMHG | HEART RATE: 60 BPM | BODY MASS INDEX: 30.8 KG/M2 | HEIGHT: 64 IN | OXYGEN SATURATION: 97 %

## 2022-07-26 DIAGNOSIS — Z12.31 ENCOUNTER FOR SCREENING MAMMOGRAM FOR MALIGNANT NEOPLASM OF BREAST: Primary | ICD-10-CM

## 2022-07-26 DIAGNOSIS — K21.9 GASTROESOPHAGEAL REFLUX DISEASE WITHOUT ESOPHAGITIS: ICD-10-CM

## 2022-07-26 DIAGNOSIS — R73.9 HYPERGLYCEMIA: ICD-10-CM

## 2022-07-26 DIAGNOSIS — I10 PRIMARY HYPERTENSION: ICD-10-CM

## 2022-07-26 DIAGNOSIS — Z00.00 ROUTINE ADULT HEALTH MAINTENANCE: Primary | ICD-10-CM

## 2022-07-26 DIAGNOSIS — E55.9 VITAMIN D DEFICIENCY: ICD-10-CM

## 2022-07-26 DIAGNOSIS — E78.5 HYPERLIPIDEMIA, UNSPECIFIED HYPERLIPIDEMIA TYPE: ICD-10-CM

## 2022-07-26 DIAGNOSIS — Z12.31 ENCOUNTER FOR SCREENING MAMMOGRAM FOR MALIGNANT NEOPLASM OF BREAST: ICD-10-CM

## 2022-07-26 PROCEDURE — 1123F ACP DISCUSS/DSCN MKR DOCD: CPT | Performed by: INTERNAL MEDICINE

## 2022-07-26 PROCEDURE — 3017F COLORECTAL CA SCREEN DOC REV: CPT | Performed by: INTERNAL MEDICINE

## 2022-07-26 PROCEDURE — G0439 PPPS, SUBSEQ VISIT: HCPCS | Performed by: INTERNAL MEDICINE

## 2022-07-26 RX ORDER — MULTIVITAMIN WITH IRON
250 TABLET ORAL DAILY
COMMUNITY

## 2022-07-26 ASSESSMENT — PATIENT HEALTH QUESTIONNAIRE - PHQ9
2. FEELING DOWN, DEPRESSED OR HOPELESS: 0
SUM OF ALL RESPONSES TO PHQ QUESTIONS 1-9: 0
1. LITTLE INTEREST OR PLEASURE IN DOING THINGS: 0
SUM OF ALL RESPONSES TO PHQ9 QUESTIONS 1 & 2: 0

## 2022-07-26 ASSESSMENT — LIFESTYLE VARIABLES: HOW OFTEN DO YOU HAVE A DRINK CONTAINING ALCOHOL: NEVER

## 2022-07-26 NOTE — PROGRESS NOTES
Chief Complaint   Patient presents with    Medicare AWV       HPI: Chema Saul is a 66 y.o. female is here for her annual Medicare wellness exam.  Her functional status is good. She denies any history of recent falls. She has no concerns in regards to safety, hearing, or cognition. She is scheduled for an endoscopy per Dr. Ruby Stringer and rose. She has been told that she has a \"infection in her stomach. \". She does see Dr. Taran David for history of a right groin follicular cyst, which has resolved. She is due for a mammogram.  She states that she has a spot on her right breast that is painful. I do not feel any lumps or bumps today. However, she states over the past couple of months, she has noted more pain in the breast.    Her acid reflux is well controlled. Her blood pressure is stable. She denies any complaints of chest pain, chest pressure or shortness of breath. She does have a history of vitamin D deficiency. She has not had any further lower extremity swelling since she came off of the amlodipine. Routine screening is as follows:    Eye exam yearly  Flu vaccine is up-to-date  Pap Smear: Declined  Mammogram ordered  Colonoscopy was done last year  Bone density July 2021  Pneumovax is up-to-date    Past Medical History:   Diagnosis Date    GERD (gastroesophageal reflux disease)     Hypertension     Other hemorrhoids       Past Surgical History:   Procedure Laterality Date    BLADDER SURGERY  09/2011    bladder sling, vaginal prolaspe, rectocele, cystocele, enterocele    BREAST SURGERY Left 12/2007    duct removed.     CYST REMOVAL Left 07/2016    breast    GROIN SURGERY Right 2/15/2022    EXCISION RIGHT GROIN FOLLICULAR CYST performed by Darrel Perez DO at [facility] (CERVIX STATUS UNKNOWN)  1976    HYSTERECTOMY, VAGINAL      OVARY REMOVAL Bilateral 1980    age 31    RECTOCELE REPAIR  09/2011    TONSILLECTOMY  1968    US BREAST FINE NEEDLE ASPIRATION Left 2016    benign Social History     Socioeconomic History    Marital status:      Spouse name: None    Number of children: None    Years of education: None    Highest education level: None   Tobacco Use    Smoking status: Never    Smokeless tobacco: Never   Substance and Sexual Activity    Alcohol use: Never    Drug use: Never     Social Determinants of Health     Financial Resource Strain: Low Risk     Difficulty of Paying Living Expenses: Not hard at all   Food Insecurity: No Food Insecurity    Worried About Running Out of Food in the Last Year: Never true    Ran Out of Food in the Last Year: Never true   Physical Activity: Inactive    Days of Exercise per Week: 0 days    Minutes of Exercise per Session: 0 min      Family History   Problem Relation Age of Onset    Dementia Mother     Other Father         digestive system,  in MVA    Other Sister         covid    Cancer Brother         -lung    Cancer Brother         prostate        Current Outpatient Medications   Medication Sig Dispense Refill    magnesium (MAGNESIUM-OXIDE) 250 MG TABS tablet Take 250 mg by mouth in the morning.       Probiotic Product (PROBIOTIC DAILY PO) Take by mouth      pantoprazole (PROTONIX) 40 MG tablet Take 1 tablet by mouth daily 90 tablet 1    losartan (COZAAR) 25 MG tablet Take 1 tablet by mouth daily 30 tablet 5    metoprolol succinate (TOPROL XL) 100 MG extended release tablet Take 0.5 tablets by mouth 2 times daily 90 tablet 1    hydroCHLOROthiazide (MICROZIDE) 12.5 MG capsule Take 1 capsule by mouth every morning 30 capsule 1    ascorbic acid (VITAMIN C) 500 MG tablet Take 500 mg by mouth daily      aspirin 81 MG chewable tablet Take 81 mg by mouth daily      Biotin 5 MG CAPS Take by mouth      calcium carbonate 1500 (600 Ca) MG TABS tablet Take 600 mg by mouth daily      vitamin D (CHOLECALCIFEROL) 25 MCG (1000 UT) TABS tablet Take 1,000 Units by mouth daily      Cyanocobalamin 1000 MCG CAPS Take by mouth       No current (1.626 m)   Wt 180 lb 6.4 oz (81.8 kg)   SpO2 97%   BMI 30.97 kg/m²   Physical Exam  Vitals and nursing note reviewed. Constitutional:       General: She is not in acute distress. Appearance: Normal appearance. She is well-developed. She is not ill-appearing, toxic-appearing or diaphoretic. HENT:      Head: Normocephalic and atraumatic. Right Ear: Tympanic membrane, ear canal and external ear normal. There is no impacted cerumen. Left Ear: Tympanic membrane, ear canal and external ear normal. There is no impacted cerumen. Nose: Nose normal. No congestion or rhinorrhea. Mouth/Throat:      Mouth: Mucous membranes are moist.      Pharynx: Oropharynx is clear. No oropharyngeal exudate or posterior oropharyngeal erythema. Eyes:      General: No scleral icterus. Right eye: No discharge. Left eye: No discharge. Extraocular Movements: Extraocular movements intact. Conjunctiva/sclera: Conjunctivae normal.      Pupils: Pupils are equal, round, and reactive to light. Neck:      Thyroid: No thyromegaly. Vascular: No carotid bruit or JVD. Trachea: No tracheal deviation. Cardiovascular:      Rate and Rhythm: Normal rate and regular rhythm. Pulses: Normal pulses. Heart sounds: Normal heart sounds. No murmur heard. No friction rub. No gallop. Pulmonary:      Effort: Pulmonary effort is normal. No respiratory distress. Breath sounds: Normal breath sounds. No stridor. No wheezing, rhonchi or rales. Chest:      Chest wall: No tenderness. Abdominal:      General: Bowel sounds are normal. There is no distension. Palpations: Abdomen is soft. There is no mass. Tenderness: There is no abdominal tenderness. There is no right CVA tenderness, left CVA tenderness, guarding or rebound. Hernia: No hernia is present.    Genitourinary:     Comments: Breasts: breasts appear normal, no suspicious masses, no skin or nipple changes or axillary nodes   Musculoskeletal:         General: No swelling, tenderness, deformity or signs of injury. Normal range of motion. Cervical back: Normal range of motion and neck supple. No rigidity. No muscular tenderness. Right lower leg: No edema. Left lower leg: No edema. Lymphadenopathy:      Cervical: No cervical adenopathy. Skin:     General: Skin is warm and dry. Coloration: Skin is not jaundiced or pale. Findings: No bruising, erythema, lesion or rash. Neurological:      General: No focal deficit present. Mental Status: She is alert and oriented to person, place, and time. Mental status is at baseline. Cranial Nerves: No cranial nerve deficit. Motor: No weakness or abnormal muscle tone. Coordination: Coordination normal.      Gait: Gait normal.      Deep Tendon Reflexes: Reflexes are normal and symmetric. Reflexes normal.   Psychiatric:         Mood and Affect: Mood normal.         Behavior: Behavior normal.         Thought Content: Thought content normal.         Judgment: Judgment normal.       1. Routine adult health maintenance    2. Encounter for screening mammogram for malignant neoplasm of breast    3. Hyperglycemia    4. Vitamin D deficiency    5. Hyperlipidemia, unspecified hyperlipidemia type     6. Gastroesophageal reflux disease without esophagitis    7. Primary hypertension        ASSESSMENT/PLAN:    27-year-old woman here for follow-up    1. Health maintenance: Routine screening is as per HPI. Labs discussed with patient today    2. Acid reflux: Continue Protonix as prescribed    3. Hypertension: Blood pressure well controlled on losartan, metoprolol and hydrochlorothiazide. She very seldom has to take the hydrochlorothiazide. Her lower extremity swelling has resolved since coming off the amlodipine. 4.  Vitamin D deficiency: Continue ergocalciferol    5.   Hyperglycemia: Check an A1c with next lab draw  Pito Jones was seen today for medicare kasi.    Diagnoses and all orders for this visit:    Routine adult health maintenance  -     TSH; Future    Encounter for screening mammogram for malignant neoplasm of breast  -     DOROTHY DIGITAL SCREEN W OR WO CAD BILATERAL; Future    Hyperglycemia  -     CBC with Auto Differential; Future  -     Comprehensive Metabolic Panel; Future  -     Hemoglobin A1C; Future  -     Lipid Panel; Future  -     TSH; Future  -     Vitamin D 25 Hydroxy; Future    Vitamin D deficiency  -     Vitamin D 25 Hydroxy; Future    Hyperlipidemia, unspecified hyperlipidemia type   -     Lipid Panel; Future    Gastroesophageal reflux disease without esophagitis    Primary hypertension        Return in about 6 months (around 1/26/2023). Orders Placed This Encounter   Procedures    DOROTHY DIGITAL SCREEN W OR WO CAD BILATERAL     Standing Status:   Future     Standing Expiration Date:   9/26/2023     Scheduling Instructions:      [facility]     Order Specific Question:   Reason for exam:     Answer:   screening     Order Specific Question:   Does this patient have a personal history of breast cancer? Answer:   No     Order Specific Question:   Where was last mammogram performed? Answer:   [facility] Specific Question:   When was last mammogram performed?      Answer:   7/28/2021    CBC with Auto Differential     Fast 12 hours     Standing Status:   Future     Standing Expiration Date:   7/26/2023    Comprehensive Metabolic Panel     Fasting 12 hours     Standing Status:   Future     Standing Expiration Date:   7/26/2023    Hemoglobin A1C     Fast 12 hours     Standing Status:   Future     Standing Expiration Date:   7/26/2023    Lipid Panel     Standing Status:   Future     Standing Expiration Date:   7/26/2023     Order Specific Question:   Is Patient Fasting?/# of Hours     Answer:   12    TSH     Fast 12 hours     Standing Status:   Future     Standing Expiration Date:   7/26/2023    Vitamin D 25 Hydroxy     Standing Status:   Future Standing Expiration Date:   2023       Maritza Lee MD     Medicare Annual Wellness Visit - Subsequent    Name: Ashley Gutierrez Date: 8/3/2022   MRN: [id number] Sex: Female   Age: 66 y.o. Ethnicity: Non- / Non    : 1950 Race: White (non-)      Hi Taylor is here for   Chief Complaint   Patient presents with    Medicare AWV        Screenings for behavioral, psychosocial and functional/safety risks, and cognitive dysfunction are all negative except as indicated below. These results, as well as other patient data from the [address] form, are documented in Flowsheets linked to this Encounter. Allergies   Allergen Reactions    Tegaderm Ag Mesh [Silver] Itching    Chlorhexidine Itching     Patient says she's not allergic to this    Lisinopril      cough    Sulfa Antibiotics Swelling    Codeine Nausea And Vomiting       Prior to Visit Medications    Medication Sig Taking? Authorizing Provider   magnesium (MAGNESIUM-OXIDE) 250 MG TABS tablet Take 250 mg by mouth in the morning.  Yes Historical Provider, MD   Probiotic Product (PROBIOTIC DAILY PO) Take by mouth Yes Historical Provider, MD   pantoprazole (PROTONIX) 40 MG tablet Take 1 tablet by mouth daily Yes Maritza Lee MD   losartan (COZAAR) 25 MG tablet Take 1 tablet by mouth daily Yes Maritza Lee MD   metoprolol succinate (TOPROL XL) 100 MG extended release tablet Take 0.5 tablets by mouth 2 times daily Yes Maritza Lee MD   hydroCHLOROthiazide (MICROZIDE) 12.5 MG capsule Take 1 capsule by mouth every morning Yes Maritza Lee MD   ascorbic acid (VITAMIN C) 500 MG tablet Take 500 mg by mouth daily Yes Historical Provider, MD   aspirin 81 MG chewable tablet Take 81 mg by mouth daily Yes Historical Provider, MD   Biotin 5 MG CAPS Take by mouth Yes Historical Provider, MD   calcium carbonate 1500 (600 Ca) MG TABS tablet Take 600 mg by mouth daily Yes Historical Provider, MD   vitamin D (CHOLECALCIFEROL) 25 MCG (1000 UT) TABS tablet Take 1,000 Units by mouth daily Yes Historical Provider, MD   Cyanocobalamin 1000 MCG CAPS Take by mouth Yes Historical Provider, MD         Past Medical History:   Diagnosis Date    GERD (gastroesophageal reflux disease)     Hypertension     Other hemorrhoids          Past Surgical History:   Procedure Laterality Date    BLADDER SURGERY  2011    bladder sling, vaginal prolaspe, rectocele, cystocele, enterocele    BREAST SURGERY Left 2007    duct removed. CYST REMOVAL Left 2016    breast    GROIN SURGERY Right 2/15/2022    EXCISION RIGHT GROIN FOLLICULAR CYST performed by Mark Choi DO at [facility] (CERVIX STATUS UNKNOWN)      HYSTERECTOMY, VAGINAL      OVARY REMOVAL Bilateral     age 31    [address]  2011    TONSILLECTOMY  1968    US BREAST FINE NEEDLE ASPIRATION Left     benign       Family History   Problem Relation Age of Onset    Dementia Mother     Other Father         digestive system,  in MVA    Other Sister         covid    Cancer Brother         -lung    Cancer Brother         prostate       CareTeam (Including outside providers/suppliers regularly involved in providing care):   Patient Care Team:  Maritza Lee MD as PCP - General (Internal Medicine)  Maritza Lee MD as PCP - [facility] Empaneled Provider    Wt Readings from Last 3 Encounters:   22 180 lb 6.4 oz (81.8 kg)   22 179 lb 6.4 oz (81.4 kg)   02/15/22 178 lb (80.7 kg)     Vitals:    22 1045   BP: 114/80   Pulse: 60   SpO2: 97%   Weight: 180 lb 6.4 oz (81.8 kg)   Height: 5' 4\" (1.626 m)           The following problems were reviewed today and where indicated follow up appointments were made and/or referrals ordered.     Risk Factor Screenings with Interventions     Fall Risk:  2 or more falls in past year?: no  Fall with injury in past year?: no  Fall Risk Interventions:    Home safety tips provided    Depression:  PHQ-2 Score: 0  Depression Interventions:  Relaxation techniques discussed    Anxiety:     Anxiety Interventions:  Relaxation techniques discussed    Cognitive:  Clock Drawing Test (CDT): Normal  Cognitive Impairment Interventions:  Patient declines any further evaluation/treatment for cognitive impairment    Substance Abuse:  Social History     Socioeconomic History    Marital status:      Spouse name: Not on file    Number of children: Not on file    Years of education: Not on file    Highest education level: Not on file   Occupational History    Not on file   Tobacco Use    Smoking status: Never    Smokeless tobacco: Never   Substance and Sexual Activity    Alcohol use: Never    Drug use: Never    Sexual activity: Not on file   Other Topics Concern    Not on file   Social History Narrative    Not on file     Social Determinants of Health     Financial Resource Strain: Low Risk     Difficulty of Paying Living Expenses: Not hard at all   Food Insecurity: No Food Insecurity    Worried About Running Out of Food in the Last Year: Never true    Ran Out of Food in the Last Year: Never true   Transportation Needs: Not on file   Physical Activity: Inactive    Days of Exercise per Week: 0 days    Minutes of Exercise per Session: 0 min   Stress: Not on file   Social Connections: Not on file   Intimate Partner Violence: Not on file   Housing Stability: Not on file        Substance Abuse Interventions:  No concerns    Health Risk Assessment:     General  In general, how would you say your health is?: Good  In the past 7 days, have you experienced any of the following: New or Increased Pain, New or Increased Fatigue, Loneliness, Social Isolation, Stress or Anger?: No  Do you get the social and emotional support that you need?: Yes  General Health Risk Interventions:  No concerns    Health Habits/Nutrition  On average, how many days per week do you engage in moderate to strenuous exercise (like a brisk walk)?: 0 days  On average, how many minutes do you engage in exercise at this level?: 0 min  Have you lost any weight without trying in the past 3 months?: No  Have you seen the dentist within the past year?: Yes  Body mass index is 30.97 kg/m².   Health Habits/Nutrition Interventions:  No concerns    Hearing/Vision  Do you or your family notice any trouble with your hearing that hasn't been managed with hearing aids?: No  Do you have difficulty driving, watching TV, or doing any of your daily activities because of your eyesight?: No  Have you had an eye exam within the past year?: Yes  Hearing/Vision Interventions:  No concernsr    Safety  Do you have working smoke detectors?: Yes  Do you have any tripping hazards - loose or unsecured carpets or rugs?: No  Do you have any tripping hazards - clutter in doorways, halls, or stairs?: No  Do you have either shower bars, grab bars, non-slip mats or non-slip surfaces in your shower or bathtub?: Yes  Do all of your stairways have a railing or banister?: Not Applicable  Do you always fasten your seatbelt when you are in a car?: Yes  Safety Interventions:  Patient declines any further evaluation/treatment for this issue    ADLs  In the past 7 days, did you need help from others to perform any of the following everyday activities: Eating, dressing, grooming, bathing, toileting, or walking/balance?: No  In the past 7 days, did you need help from others to take care of any of the following: Laundry, housekeeping, banking/finances, shopping, telephone use, food preparation, transportation, or taking medications?: No  ADL Interventions:  Patient declines any further evaluation/treatment for this issue    Personalized Preventive Plan   Current Health Maintenance Status  Immunization History   Administered Date(s) Administered    COVID-19, MODERNA BLUE border, Primary or Immunocompromised, (age 12y+), IM, 100 mcg/0.5mL 02/26/2021, 03/26/2021    COVID-19, [address] border, (age 18y+), IM, 50mcg/0.25mL 11/13/2021    Influenza, High Dose (Fluzone 65 yrs and older) 10/20/2020, 10/12/2021    Pneumococcal Conjugate 13-valent ([place]) 10/18/2015    Pneumococcal Polysaccharide (Ciubifyre81) 10/03/2016        Health Maintenance   Topic Date Due    Annual Wellness Visit (AWV)  Never done    Hepatitis C screen  Never done    DTaP/Tdap/Td vaccine (1 - Tdap) Never done    Colorectal Cancer Screen  Never done    Shingles vaccine (1 of 2) Never done    COVID-19 Vaccine (4 - Booster for Moderna series) 03/13/2022    Flu vaccine (1) 09/01/2022    Depression Screen  07/26/2023    Breast cancer screen  12/20/2023    Lipids  12/17/2026    DEXA (modify frequency per FRAX score)  Completed    Pneumococcal 65+ years Vaccine  Completed    Hepatitis A vaccine  Aged Out    Hepatitis B vaccine  Aged Out    Hib vaccine  Aged Out    Meningococcal (ACWY) vaccine  Aged Out       Recommendations for [employer] Due: see orders.   Recommended screening schedule for the next 5-10 years is provided to the patient in written form: see Patient Instructions/AVS.

## 2022-08-03 ASSESSMENT — ENCOUNTER SYMPTOMS
ABDOMINAL DISTENTION: 0
SORE THROAT: 0
CHOKING: 0
VOMITING: 0
WHEEZING: 0
ANAL BLEEDING: 0
SHORTNESS OF BREATH: 0
TROUBLE SWALLOWING: 0
PHOTOPHOBIA: 0
ABDOMINAL PAIN: 0
CONSTIPATION: 0
BACK PAIN: 0
RECTAL PAIN: 0
COLOR CHANGE: 0
EYE PAIN: 0
DIARRHEA: 0
VOICE CHANGE: 0
BLOOD IN STOOL: 0
RHINORRHEA: 0
CHEST TIGHTNESS: 0
SINUS PRESSURE: 0
EYE REDNESS: 0
EYE DISCHARGE: 0
EYE ITCHING: 0
NAUSEA: 0
COUGH: 0
FACIAL SWELLING: 0

## 2022-08-04 ENCOUNTER — HOSPITAL ENCOUNTER (OUTPATIENT)
Dept: MAMMOGRAPHY | Facility: HOSPITAL | Age: 72
Discharge: HOME | End: 2022-08-04
Admitting: INTERNAL MEDICINE

## 2022-08-04 DIAGNOSIS — Z12.31 ENCOUNTER FOR SCREENING MAMMOGRAM FOR MALIGNANT NEOPLASM OF BREAST: ICD-10-CM

## 2022-08-04 PROCEDURE — 77063 BREAST TOMOSYNTHESIS BI: CPT

## 2022-08-04 PROCEDURE — 77067 SCR MAMMO BI INCL CAD: CPT

## 2022-08-05 ENCOUNTER — TELEPHONE (OUTPATIENT)
Dept: INTERNAL MEDICINE | Age: 72
End: 2022-08-05

## 2022-08-05 DIAGNOSIS — R92.8 ABNORMAL MAMMOGRAM: Primary | ICD-10-CM

## 2022-08-05 DIAGNOSIS — Z12.31 ENCOUNTER FOR SCREENING MAMMOGRAM FOR MALIGNANT NEOPLASM OF BREAST: ICD-10-CM

## 2022-08-05 NOTE — TELEPHONE ENCOUNTER
----- Message from Jaylan Darling MD sent at 8/5/2022 11:59 AM CDT -----  Has an abnormality on her ultrasound she has an abnormality on her mammogram.  She needs a spot compression and ultrasound of the left breast.

## 2022-08-05 NOTE — TELEPHONE ENCOUNTER
Patient notified and verbally understood. Orders were placed and patient requesting orders be sent to [facility].

## 2022-08-16 ENCOUNTER — HOSPITAL ENCOUNTER (OUTPATIENT)
Dept: ULTRASOUND IMAGING | Facility: HOSPITAL | Age: 72
Discharge: HOME | End: 2022-08-16

## 2022-08-16 ENCOUNTER — HOSPITAL ENCOUNTER (OUTPATIENT)
Dept: MAMMOGRAPHY | Facility: HOSPITAL | Age: 72
Discharge: HOME | End: 2022-08-16

## 2022-08-16 DIAGNOSIS — R92.8 ABNORMAL MAMMOGRAM: ICD-10-CM

## 2022-08-16 PROCEDURE — G0279 TOMOSYNTHESIS, MAMMO: HCPCS

## 2022-08-16 PROCEDURE — 77065 DX MAMMO INCL CAD UNI: CPT

## 2022-09-12 DIAGNOSIS — I10 PRIMARY HYPERTENSION: ICD-10-CM

## 2022-09-12 RX ORDER — LOSARTAN POTASSIUM 25 MG/1
25 TABLET ORAL DAILY
Qty: 30 TABLET | Refills: 5 | Status: SHIPPED | OUTPATIENT
Start: 2022-09-12

## 2022-09-12 NOTE — TELEPHONE ENCOUNTER
José Luis Evelia called to request a refill on her medication.       Last office visit : 7/26/2022   Next office visit : 1/26/2023     Requested Prescriptions     Pending Prescriptions Disp Refills    losartan (COZAAR) 25 MG tablet 30 tablet 5     Sig: Take 1 tablet by mouth daily            Kellie Corado MA

## 2022-10-10 DIAGNOSIS — K21.9 GASTROESOPHAGEAL REFLUX DISEASE WITHOUT ESOPHAGITIS: ICD-10-CM

## 2022-10-10 DIAGNOSIS — I10 PRIMARY HYPERTENSION: ICD-10-CM

## 2022-10-10 RX ORDER — METOPROLOL SUCCINATE 100 MG/1
50 TABLET, EXTENDED RELEASE ORAL 2 TIMES DAILY
Qty: 90 TABLET | Refills: 1 | Status: SHIPPED | OUTPATIENT
Start: 2022-10-10

## 2022-10-10 RX ORDER — PANTOPRAZOLE SODIUM 40 MG/1
40 TABLET, DELAYED RELEASE ORAL DAILY
Qty: 90 TABLET | Refills: 1 | Status: SHIPPED | OUTPATIENT
Start: 2022-10-10

## 2022-10-10 NOTE — TELEPHONE ENCOUNTER
Lisa Grace called to request a refill on her medication.       Last office visit : 7/26/2022   Next office visit : 1/26/2023     Requested Prescriptions     Pending Prescriptions Disp Refills    pantoprazole (PROTONIX) 40 MG tablet 90 tablet 1     Sig: Take 1 tablet by mouth daily    metoprolol succinate (TOPROL XL) 100 MG extended release tablet 90 tablet 1     Sig: Take 0.5 tablets by mouth 2 times daily            Amadou Elliott MA

## 2023-01-17 DIAGNOSIS — I10 PRIMARY HYPERTENSION: ICD-10-CM

## 2023-01-17 RX ORDER — METOPROLOL SUCCINATE 100 MG/1
50 TABLET, EXTENDED RELEASE ORAL 2 TIMES DAILY
Qty: 90 TABLET | Refills: 1 | Status: SHIPPED | OUTPATIENT
Start: 2023-01-17

## 2023-01-17 NOTE — TELEPHONE ENCOUNTER
Octaviano Altman called to request a refill on her medication.       Last office visit : 7/26/2022   Next office visit : 1/26/2023     Requested Prescriptions     Pending Prescriptions Disp Refills    metoprolol succinate (TOPROL XL) 100 MG extended release tablet 90 tablet 1     Sig: Take 0.5 tablets by mouth 2 times daily            Reed Francis LPN

## 2023-02-15 DIAGNOSIS — R73.9 HYPERGLYCEMIA: ICD-10-CM

## 2023-02-15 DIAGNOSIS — E78.5 HYPERLIPIDEMIA, UNSPECIFIED HYPERLIPIDEMIA TYPE: ICD-10-CM

## 2023-02-15 DIAGNOSIS — E55.9 VITAMIN D DEFICIENCY: ICD-10-CM

## 2023-02-15 DIAGNOSIS — Z00.00 ROUTINE ADULT HEALTH MAINTENANCE: ICD-10-CM

## 2023-02-21 ENCOUNTER — OFFICE VISIT (OUTPATIENT)
Dept: INTERNAL MEDICINE | Age: 73
End: 2023-02-21

## 2023-02-21 ENCOUNTER — HOSPITAL ENCOUNTER (OUTPATIENT)
Dept: GENERAL RADIOLOGY | Age: 73
Discharge: HOME | End: 2023-02-21
Payer: MEDICARE

## 2023-02-21 VITALS
BODY MASS INDEX: 30.05 KG/M2 | SYSTOLIC BLOOD PRESSURE: 128 MMHG | WEIGHT: 176 LBS | HEIGHT: 64 IN | OXYGEN SATURATION: 95 % | DIASTOLIC BLOOD PRESSURE: 82 MMHG | HEART RATE: 68 BPM

## 2023-02-21 DIAGNOSIS — R73.9 HYPERGLYCEMIA: ICD-10-CM

## 2023-02-21 DIAGNOSIS — I10 PRIMARY HYPERTENSION: ICD-10-CM

## 2023-02-21 DIAGNOSIS — K21.9 GASTROESOPHAGEAL REFLUX DISEASE WITHOUT ESOPHAGITIS: Primary | ICD-10-CM

## 2023-02-21 DIAGNOSIS — H66.90 ACUTE OTITIS MEDIA, UNSPECIFIED OTITIS MEDIA TYPE: ICD-10-CM

## 2023-02-21 DIAGNOSIS — E53.8 B12 DEFICIENCY: ICD-10-CM

## 2023-02-21 DIAGNOSIS — M25.50 ARTHRALGIA, UNSPECIFIED JOINT: ICD-10-CM

## 2023-02-21 DIAGNOSIS — E55.9 VITAMIN D DEFICIENCY: ICD-10-CM

## 2023-02-21 DIAGNOSIS — Z91.09 ENVIRONMENTAL ALLERGIES: ICD-10-CM

## 2023-02-21 DIAGNOSIS — R09.89 GLOBUS SENSATION: ICD-10-CM

## 2023-02-21 DIAGNOSIS — J01.90 ACUTE SINUSITIS, RECURRENCE NOT SPECIFIED, UNSPECIFIED LOCATION: ICD-10-CM

## 2023-02-21 DIAGNOSIS — M25.511 RIGHT SHOULDER PAIN, UNSPECIFIED CHRONICITY: ICD-10-CM

## 2023-02-21 DIAGNOSIS — R13.10 DYSPHAGIA, UNSPECIFIED TYPE: ICD-10-CM

## 2023-02-21 PROCEDURE — 73030 X-RAY EXAM OF SHOULDER: CPT

## 2023-02-21 RX ORDER — FLUTICASONE PROPIONATE 50 MCG
1 SPRAY, SUSPENSION (ML) NASAL DAILY
Qty: 16 G | Refills: 2 | Status: SHIPPED | OUTPATIENT
Start: 2023-02-21

## 2023-02-21 RX ORDER — LOSARTAN POTASSIUM 25 MG/1
25 TABLET ORAL DAILY
Qty: 90 TABLET | Refills: 3 | Status: SHIPPED | OUTPATIENT
Start: 2023-02-21

## 2023-02-21 RX ORDER — METOPROLOL SUCCINATE 100 MG/1
50 TABLET, EXTENDED RELEASE ORAL 2 TIMES DAILY
Qty: 90 TABLET | Refills: 3 | Status: SHIPPED | OUTPATIENT
Start: 2023-02-21

## 2023-02-21 RX ORDER — AMOXICILLIN AND CLAVULANATE POTASSIUM 875; 125 MG/1; MG/1
1 TABLET, FILM COATED ORAL 2 TIMES DAILY
Qty: 20 TABLET | Refills: 0 | Status: SHIPPED | OUTPATIENT
Start: 2023-02-21 | End: 2023-03-03

## 2023-02-21 RX ORDER — PANTOPRAZOLE SODIUM 40 MG/1
40 TABLET, DELAYED RELEASE ORAL DAILY
Qty: 90 TABLET | Refills: 3 | Status: SHIPPED | OUTPATIENT
Start: 2023-02-21

## 2023-02-21 NOTE — PROGRESS NOTES
Chief Complaint   Patient presents with    6 Month Follow-Up    Shoulder Pain     When she tries to extend, Ear fullness more so in left ear. Back, knee and hip pain. HPI: Di Cotto is a 67 y.o. female is here for an is here for 6-month follow-up of acid reflux, hypertension, environmental allergies, and vitamin D deficiency. She states that she has been having right shoulder pain for a few weeks. She states that she has more difficulty with extension. She also complains of ear fullness. She states that it is more  full in the right ear. Complain of some sinus pressure and pain. She states that she has been treated for dry eye syndrome. She complains of sinus drainage. She states that her left ear feels completely stopped up. Sometimes, she feels like her throat closes up. She spoke with her dentist who recommended that she have an ENT evaluation. She states that she does not feel anxious or nervous when this occurs. She states that she feels like there is something down in her throat. She is agreeable to referral to ENT. She also thinks that she has traveling arthritis. Her hips, knees back and shoulders hurt. In general, changes make her symptoms worse. Over-the-counter medications do help. However, as far as the right shoulder pain occur goes, she states that she is unable to lift her arm. She feels like there is pain and pulling on extension. Her blood pressure is well controlled. She denies any complaints of chest pain, chest pressure or shortness of breath. Her acid reflux is well controlled.   She does have a history of vitamin D deficiency and she is taking vitamin D supplementation    Past Medical History:   Diagnosis Date    GERD (gastroesophageal reflux disease)     Hypertension     Other hemorrhoids       Past Surgical History:   Procedure Laterality Date    BLADDER SURGERY  09/2011    bladder sling, vaginal prolaspe, rectocele, cystocele, enterocele    BREAST SURGERY Left 2007    duct removed. CYST REMOVAL Left 2016    breast    GROIN SURGERY Right 2/15/2022    EXCISION RIGHT GROIN FOLLICULAR CYST performed by Yao Del Castillo DO at [facility], [place] [address] (CERVIX STATUS UNKNOWN)      HYSTERECTOMY, VAGINAL      OVARY REMOVAL Bilateral     age 31    [address]  2011    [address] BREAST FINE NEEDLE ASPIRATION Left     benign      Social History     Socioeconomic History    Marital status:    Tobacco Use    Smoking status: Never    Smokeless tobacco: Never   Substance and Sexual Activity    Alcohol use: Never    Drug use: Never     Social Determinants of Health     Physical Activity: Inactive    Days of Exercise per Week: 0 days    Minutes of Exercise per Session: 0 min      Family History   Problem Relation Age of Onset    Dementia Mother     Other Father         digestive system,  in MVA    Other Sister         covid    Cancer Brother         -lung    Cancer Brother         prostate        Current Outpatient Medications   Medication Sig Dispense Refill    pantoprazole (PROTONIX) 40 MG tablet Take 1 tablet by mouth daily 90 tablet 3    losartan (COZAAR) 25 MG tablet Take 1 tablet by mouth daily 90 tablet 3    metoprolol succinate (TOPROL XL) 100 MG extended release tablet Take 0.5 tablets by mouth 2 times daily 90 tablet 3    fluticasone (FLONASE) 50 MCG/ACT nasal spray 1 spray by Each Nostril route daily 16 g 2    amoxicillin-clavulanate (AUGMENTIN) 875-125 MG per tablet Take 1 tablet by mouth 2 times daily for 10 days 20 tablet 0    magnesium (MAGNESIUM-OXIDE) 250 MG TABS tablet Take 250 mg by mouth in the morning.       Probiotic Product (PROBIOTIC DAILY PO) Take by mouth      hydroCHLOROthiazide (MICROZIDE) 12.5 MG capsule Take 1 capsule by mouth every morning 30 capsule 1    ascorbic acid (VITAMIN C) 500 MG tablet Take 500 mg by mouth daily      aspirin 81 MG chewable tablet Take 81 mg by mouth daily Biotin 5 MG CAPS Take by mouth      calcium carbonate 1500 (600 Ca) MG TABS tablet Take 600 mg by mouth daily      vitamin D (CHOLECALCIFEROL) 25 MCG (1000 UT) TABS tablet Take 1,000 Units by mouth daily      Cyanocobalamin 1000 MCG CAPS Take by mouth      fluconazole (DIFLUCAN) 100 MG tablet Take 1 tablet by mouth daily for 3 days 3 tablet 0     No current facility-administered medications for this visit. Patient Active Problem List   Diagnosis    Follicular cyst of skin        Review of Systems   Constitutional:  Negative for activity change, appetite change, chills, diaphoresis, fatigue, fever and unexpected weight change. HENT:  Positive for congestion, ear pain, sinus pressure and sinus pain. Negative for facial swelling, hearing loss, mouth sores, nosebleeds, postnasal drip, rhinorrhea, sneezing, sore throat, tinnitus, trouble swallowing and voice change. Globus sensation, feels like there is something in her throat   Eyes:  Negative for photophobia, pain, discharge, redness, itching and visual disturbance. Dry eyes   Respiratory:  Negative for cough, choking, chest tightness, shortness of breath and wheezing. Cardiovascular:  Negative for chest pain, palpitations and leg swelling. Gastrointestinal:  Negative for abdominal distention, abdominal pain, anal bleeding, blood in stool, constipation, diarrhea, nausea, rectal pain and vomiting. Endocrine: Negative for cold intolerance, heat intolerance, polydipsia, polyphagia and polyuria. Genitourinary:  Negative for decreased urine volume, difficulty urinating, dysuria, flank pain, frequency, hematuria and urgency. Musculoskeletal:  Positive for arthralgias. Negative for back pain, gait problem, joint swelling, myalgias, neck pain and neck stiffness. Diffuse arthralgias. Right shoulder pain. Unable to lift her right arm   Skin:  Negative for color change, pallor and rash.    Allergic/Immunologic: Negative for environmental allergies and food allergies. Neurological:  Negative for dizziness, tremors, syncope, weakness, light-headedness, numbness and headaches. Hematological:  Negative for adenopathy. Does not bruise/bleed easily. Psychiatric/Behavioral:  Negative for agitation, behavioral problems, confusion, decreased concentration, dysphoric mood, hallucinations, self-injury, sleep disturbance and suicidal ideas. The patient is not nervous/anxious and is not hyperactive. /82 (Site: Left Upper Arm, Position: Sitting, Cuff Size: Medium Adult)   Pulse 68   Ht 5' 4\" (1.626 m)   Wt 176 lb (79.8 kg)   SpO2 95%   BMI 30.21 kg/m²   Physical Exam  Vitals and nursing note reviewed. Constitutional:       General: She is not in acute distress. Appearance: Normal appearance. She is well-developed. She is not ill-appearing, toxic-appearing or diaphoretic. HENT:      Head: Normocephalic and atraumatic. Right Ear: Tympanic membrane, ear canal and external ear normal. There is no impacted cerumen. Left Ear: Ear canal and external ear normal. There is no impacted cerumen. Ears:      Comments: She does have pressure on palpation of the frontal and maxillary sinuses. Her left tympanic membrane is red     Nose: Nose normal. No congestion or rhinorrhea. Mouth/Throat:      Mouth: Mucous membranes are moist.      Pharynx: Oropharynx is clear. No oropharyngeal exudate or posterior oropharyngeal erythema. Eyes:      General: No scleral icterus. Right eye: No discharge. Left eye: No discharge. Extraocular Movements: Extraocular movements intact. Conjunctiva/sclera: Conjunctivae normal.      Pupils: Pupils are equal, round, and reactive to light. Neck:      Thyroid: No thyromegaly. Vascular: No carotid bruit or JVD. Trachea: No tracheal deviation. Cardiovascular:      Rate and Rhythm: Normal rate and regular rhythm. Pulses: Normal pulses.       Heart sounds: Normal heart sounds. No murmur heard. No friction rub. No gallop. Pulmonary:      Effort: Pulmonary effort is normal. No respiratory distress. Breath sounds: Normal breath sounds. No stridor. No wheezing, rhonchi or rales. Chest:      Chest wall: No tenderness. Abdominal:      General: Bowel sounds are normal. There is no distension. Palpations: Abdomen is soft. There is no mass. Tenderness: There is no abdominal tenderness. There is no right CVA tenderness, left CVA tenderness, guarding or rebound. Hernia: No hernia is present. Musculoskeletal:         General: No swelling, tenderness, deformity or signs of injury. Normal range of motion. Cervical back: Normal range of motion and neck supple. No rigidity. No muscular tenderness. Right lower leg: No edema. Left lower leg: No edema. Comments: Decreased range of motion to the left shoulder   Lymphadenopathy:      Cervical: No cervical adenopathy. Skin:     General: Skin is warm and dry. Coloration: Skin is not jaundiced or pale. Findings: No bruising, erythema, lesion or rash. Neurological:      General: No focal deficit present. Mental Status: She is alert and oriented to person, place, and time. Mental status is at baseline. Cranial Nerves: No cranial nerve deficit. Motor: No weakness or abnormal muscle tone. Coordination: Coordination normal.      Gait: Gait normal.      Deep Tendon Reflexes: Reflexes are normal and symmetric. Reflexes normal.   Psychiatric:         Mood and Affect: Mood normal.         Behavior: Behavior normal.         Thought Content: Thought content normal.         Judgment: Judgment normal.       1. Gastroesophageal reflux disease without esophagitis    2. Primary hypertension    3. Right shoulder pain, unspecified chronicity    4. Hyperglycemia    5. Vitamin D deficiency     6. Dysphagia, unspecified type    7. Arthralgia, unspecified joint    8.  Globus sensation 9. Environmental allergies    10. Acute sinusitis, recurrence not specified, unspecified location    11. Acute otitis media, unspecified otitis media type    12. Vitamin D deficiency    13. B12 deficiency        ASSESSMENT/PLAN:    79-year-old woman here for follow-up    1. Acid reflux: Well-controlled on Protonix    2. Generalized arthralgias: Continue over-the-counter medications. Symptoms persist, she may need further work-up including rheumatoid factor ESR CRP sed rate and CCP. Have an acute upper respiratory tract infection at this time. Her arthralgias may be associated with this. 3.  Globus sensation/Dysphagia: Patient thinks there is something stuck in her throat. I can refer her to ENT for further evaluation. 4.  Environmental allergies: Try Flonase    5. Acute sinusitis/otitis media: Augmentin prescribed    6. Hypertension: Blood pressure well controlled on Cozaar and metoprolol. Continue hydrochlorothiazide    7. Vitamin D deficiency[de-identified] Continue cholecalciferol    8. B12 deficiency: Continue cyanocobalamin    9. Right shoulder pain: Xray ordered    10. Hyperglycemia: Check an A1c with next lab draw    Sosa Eisenberg was seen today for 6 month follow-up and shoulder pain. Diagnoses and all orders for this visit:    Gastroesophageal reflux disease without esophagitis  -     pantoprazole (PROTONIX) 40 MG tablet; Take 1 tablet by mouth daily    Primary hypertension  -     losartan (COZAAR) 25 MG tablet; Take 1 tablet by mouth daily  -     metoprolol succinate (TOPROL XL) 100 MG extended release tablet; Take 0.5 tablets by mouth 2 times daily  -     CBC with Auto Differential; Future  -     Comprehensive Metabolic Panel; Future  -     Hemoglobin A1C; Future  -     Lipid Panel; Future  -     TSH; Future  -     Vitamin D 25 Hydroxy; Future  -     Hepatitis C Antibody; Future    Right shoulder pain, unspecified chronicity  -     XR SHOULDER RIGHT (MIN 2 VIEWS);  Future    Hyperglycemia  - Hemoglobin A1C; Future    Vitamin D deficiency   -     Vitamin D 25 Hydroxy; Future    Dysphagia, unspecified type  -     Parminder Levy MD, Otolaryngology [place]    Arthralgia, unspecified joint    Globus sensation    Environmental allergies    Acute sinusitis, recurrence not specified, unspecified location    Acute otitis media, unspecified otitis media type    Vitamin D deficiency  -     Vitamin D 25 Hydroxy; Future    B12 deficiency    Other orders  -     fluticasone (FLONASE) 50 MCG/ACT nasal spray; 1 spray by Each Nostril route daily  -     amoxicillin-clavulanate (AUGMENTIN) 875-125 MG per tablet;  Take 1 tablet by mouth 2 times daily for 10 days        Return in about 6 months (around 8/21/2023), or medicare wellness, for physical.     Orders Placed This Encounter   Procedures    XR SHOULDER RIGHT (MIN 2 VIEWS)     Standing Status:   Future     Number of Occurrences:   1     Standing Expiration Date:   2/21/2024     Order Specific Question:   Reason for exam:     Answer:   right shoulder pain    CBC with Auto Differential     Fast 12 hours     Standing Status:   Future     Standing Expiration Date:   2/21/2024    Comprehensive Metabolic Panel     Fasting 12 hours     Standing Status:   Future     Standing Expiration Date:   2/21/2024    Hemoglobin A1C     Fast 12 hours     Standing Status:   Future     Standing Expiration Date:   2/21/2024    Lipid Panel     Standing Status:   Future     Standing Expiration Date:   2/21/2024     Order Specific Question:   Is Patient Fasting?/# of Hours     Answer:   12    TSH     Fast 12 hours     Standing Status:   Future     Standing Expiration Date:   2/21/2024    Vitamin D 25 Hydroxy     Standing Status:   Future     Standing Expiration Date:   2/21/2024    Hepatitis C Antibody     Standing Status:   Future     Standing Expiration Date:   2/21/2024    Parminder Levy MD, Otolaryngology, [place]     Referral Priority:   Routine     Referral Type: Eval and Treat     Referral Reason:   Specialty Services Required     Referred to Provider:   Abdifatah Lux MD     Requested Specialty:   Otolaryngology     Number of Visits Requested:   Shahnaz Whalen MD

## 2023-02-23 ENCOUNTER — TELEPHONE (OUTPATIENT)
Dept: INTERNAL MEDICINE | Age: 73
End: 2023-02-23

## 2023-02-23 DIAGNOSIS — M19.90 ARTHRITIS: Primary | ICD-10-CM

## 2023-02-23 RX ORDER — FLUCONAZOLE 100 MG/1
100 TABLET ORAL DAILY
Qty: 3 TABLET | Refills: 0 | Status: SHIPPED | OUTPATIENT
Start: 2023-02-23 | End: 2023-02-26

## 2023-02-23 NOTE — TELEPHONE ENCOUNTER
----- Message from Lucie Lanier MD sent at 2/22/2023  4:52 AM CST -----  Her shoulder x-ray does show some arthritis. She might benefit from seeing orthopedic surgery.   What they would probably do at this point just give her injections which might help alleviate some of the symptoms that she is having

## 2023-02-23 NOTE — TELEPHONE ENCOUNTER
Pt notified, she also has a yeast infection from the antibiotics we gave her.  Please sign pended medication and referral.

## 2023-02-26 ASSESSMENT — ENCOUNTER SYMPTOMS
WHEEZING: 0
EYE ITCHING: 0
RHINORRHEA: 0
EYE REDNESS: 0
EYE DISCHARGE: 0
CONSTIPATION: 0
VOICE CHANGE: 0
ABDOMINAL PAIN: 0
ABDOMINAL DISTENTION: 0
SINUS PAIN: 1
CHOKING: 0
ANAL BLEEDING: 0
COLOR CHANGE: 0
PHOTOPHOBIA: 0
COUGH: 0
EYE PAIN: 0
SHORTNESS OF BREATH: 0
BACK PAIN: 0
DIARRHEA: 0
RECTAL PAIN: 0
CHEST TIGHTNESS: 0
NAUSEA: 0
SORE THROAT: 0
VOMITING: 0
SINUS PRESSURE: 1
TROUBLE SWALLOWING: 0
FACIAL SWELLING: 0
BLOOD IN STOOL: 0

## 2023-03-27 ENCOUNTER — OFFICE VISIT (OUTPATIENT)
Dept: ENT CLINIC | Age: 73
End: 2023-03-27
Payer: MEDICARE

## 2023-03-27 VITALS
HEIGHT: 64 IN | DIASTOLIC BLOOD PRESSURE: 78 MMHG | BODY MASS INDEX: 30.56 KG/M2 | SYSTOLIC BLOOD PRESSURE: 136 MMHG | WEIGHT: 179 LBS

## 2023-03-27 DIAGNOSIS — H65.92 MEE (MIDDLE EAR EFFUSION), LEFT: ICD-10-CM

## 2023-03-27 DIAGNOSIS — K21.9 LARYNGOPHARYNGEAL REFLUX (LPR): Primary | ICD-10-CM

## 2023-03-27 DIAGNOSIS — B37.0 ORAL THRUSH: ICD-10-CM

## 2023-03-27 DIAGNOSIS — R09.89 GLOBUS SENSATION: ICD-10-CM

## 2023-03-27 PROBLEM — R09.A2 GLOBUS SENSATION: Status: ACTIVE | Noted: 2023-03-27

## 2023-03-27 PROCEDURE — G8484 FLU IMMUNIZE NO ADMIN: HCPCS | Performed by: PHYSICIAN ASSISTANT

## 2023-03-27 PROCEDURE — G8417 CALC BMI ABV UP PARAM F/U: HCPCS | Performed by: PHYSICIAN ASSISTANT

## 2023-03-27 PROCEDURE — 3017F COLORECTAL CA SCREEN DOC REV: CPT | Performed by: PHYSICIAN ASSISTANT

## 2023-03-27 PROCEDURE — 1123F ACP DISCUSS/DSCN MKR DOCD: CPT | Performed by: PHYSICIAN ASSISTANT

## 2023-03-27 PROCEDURE — 99203 OFFICE O/P NEW LOW 30 MIN: CPT | Performed by: PHYSICIAN ASSISTANT

## 2023-03-27 PROCEDURE — G8400 PT W/DXA NO RESULTS DOC: HCPCS | Performed by: PHYSICIAN ASSISTANT

## 2023-03-27 PROCEDURE — 1090F PRES/ABSN URINE INCON ASSESS: CPT | Performed by: PHYSICIAN ASSISTANT

## 2023-03-27 PROCEDURE — G8427 DOCREV CUR MEDS BY ELIG CLIN: HCPCS | Performed by: PHYSICIAN ASSISTANT

## 2023-03-27 PROCEDURE — 1036F TOBACCO NON-USER: CPT | Performed by: PHYSICIAN ASSISTANT

## 2023-03-27 PROCEDURE — 31575 DIAGNOSTIC LARYNGOSCOPY: CPT | Performed by: PHYSICIAN ASSISTANT

## 2023-03-27 RX ORDER — OXYMETAZOLINE HYDROCHLORIDE 0.05 G/100ML
SPRAY NASAL
Qty: 30 ML | Refills: 0 | Status: SHIPPED | OUTPATIENT
Start: 2023-03-27

## 2023-03-27 RX ORDER — TRIAMCINOLONE ACETONIDE 55 UG/1
2 SPRAY, METERED NASAL 2 TIMES DAILY
Qty: 16 G | Refills: 1 | Status: SHIPPED | OUTPATIENT
Start: 2023-03-27

## 2023-03-27 ASSESSMENT — ENCOUNTER SYMPTOMS
FACIAL SWELLING: 0
SORE THROAT: 0
SINUS PAIN: 0
VOICE CHANGE: 0
EYE DISCHARGE: 0
EYE PAIN: 0
TROUBLE SWALLOWING: 0
SINUS PRESSURE: 0
RHINORRHEA: 0

## 2023-03-27 NOTE — ASSESSMENT & PLAN NOTE
Globus sensation secondary to LPR  Plan: Due to the patient being on Protonix for a prolonged period of time, I will discontinue this and start her on Nexium twice a day. She is follow-up in 6 weeks for reevaluation.

## 2023-03-27 NOTE — ASSESSMENT & PLAN NOTE
Left middle ear effusion-confirmed by microscopic exam  Plan: I will place the patient on Afrin and Flonase twice a day.

## 2023-03-27 NOTE — ASSESSMENT & PLAN NOTE
Oral thrush-noted today on laryngoscopy  Plan: I will place the patient on Mycostatin swish and swallow. We will reassess in 6 weeks.

## 2023-03-27 NOTE — PROGRESS NOTES
oropharyngeal region where the patient was found to have normal swallowing with no masses or ulcerations. The scope was then advanced to the epiglottis where the vocal cords were well visualized. The vocal cords were edematous with mild to moderate erythema to the cords and the surface of the glottis. No masses were noted at this level. The vocal cords were symmetrical and fully functional.  The tongue was extended demonstrated no masses to be present however the patient was noted to have evidence of thrush. No additional findings was noted. Orders Placed This Encounter   Medications    nystatin (MYCOSTATIN) 986352 UNIT/ML suspension     Sig: Take 5 mLs by mouth 4 times daily for 10 days Gargle for 2 to 3 minutes and then swallow     Dispense:  200 mL     Refill:  0    esomeprazole (NEXIUM) 20 MG delayed release capsule     Sig: Take 1 capsule by mouth in the morning and 1 capsule in the evening. Dispense:  60 capsule     Refill:  2     d/c protonix    oxymetazoline (AFRIN) 0.05 % nasal spray     Si squirts to each nostril twice a day x10 days     Dispense:  30 mL     Refill:  0    triamcinolone (NASACORT) 55 MCG/ACT nasal inhaler     Si sprays by Each Nostril route 2 times daily     Dispense:  16 g     Refill:  1       Electronically signed by Amanda Lawrence PA-C on 3/27/23 at 5:44 PM CDT        Please note that this chart was generated using dragon dictation software. Although every effort was made to ensure the accuracy of this automated transcription, some errors in transcription may have occurred.

## 2023-05-09 ENCOUNTER — PROCEDURE VISIT (OUTPATIENT)
Dept: ENT CLINIC | Age: 73
End: 2023-05-09
Payer: MEDICARE

## 2023-05-09 ENCOUNTER — OFFICE VISIT (OUTPATIENT)
Dept: ENT CLINIC | Age: 73
End: 2023-05-09
Payer: MEDICARE

## 2023-05-09 VITALS
WEIGHT: 174 LBS | HEIGHT: 64 IN | DIASTOLIC BLOOD PRESSURE: 76 MMHG | SYSTOLIC BLOOD PRESSURE: 132 MMHG | BODY MASS INDEX: 29.71 KG/M2

## 2023-05-09 DIAGNOSIS — H91.92 HEARING LOSS OF LEFT EAR, UNSPECIFIED HEARING LOSS TYPE: ICD-10-CM

## 2023-05-09 DIAGNOSIS — K21.9 LARYNGOPHARYNGEAL REFLUX (LPR): Primary | ICD-10-CM

## 2023-05-09 DIAGNOSIS — H93.8X2 SENSATION OF FULLNESS IN LEFT EAR: Primary | ICD-10-CM

## 2023-05-09 PROCEDURE — 1090F PRES/ABSN URINE INCON ASSESS: CPT | Performed by: PHYSICIAN ASSISTANT

## 2023-05-09 PROCEDURE — G8400 PT W/DXA NO RESULTS DOC: HCPCS | Performed by: PHYSICIAN ASSISTANT

## 2023-05-09 PROCEDURE — 99213 OFFICE O/P EST LOW 20 MIN: CPT | Performed by: PHYSICIAN ASSISTANT

## 2023-05-09 PROCEDURE — 1036F TOBACCO NON-USER: CPT | Performed by: PHYSICIAN ASSISTANT

## 2023-05-09 PROCEDURE — G8427 DOCREV CUR MEDS BY ELIG CLIN: HCPCS | Performed by: PHYSICIAN ASSISTANT

## 2023-05-09 PROCEDURE — 92567 TYMPANOMETRY: CPT | Performed by: AUDIOLOGIST

## 2023-05-09 PROCEDURE — 1123F ACP DISCUSS/DSCN MKR DOCD: CPT | Performed by: PHYSICIAN ASSISTANT

## 2023-05-09 PROCEDURE — 3017F COLORECTAL CA SCREEN DOC REV: CPT | Performed by: PHYSICIAN ASSISTANT

## 2023-05-09 PROCEDURE — G8417 CALC BMI ABV UP PARAM F/U: HCPCS | Performed by: PHYSICIAN ASSISTANT

## 2023-05-09 NOTE — PROGRESS NOTES
Ms. Won Stark is a pleasant 77-year-old  female that presents for 6-week follow-up after treatment for LPR. Patient reports that the tongue feels back to normal and is having less issues with throat clearing. She now reports that she is having persistent muffled hearing from the left ear and is requesting evaluation. She was previously found to have a questionable left middle ear effusion and was treated with Flonase and Afrin. She reports that she has noticed no changes since the medications. Due to this persisting, a tympanogram was performed today and demonstrated a type A tympanogram bilaterally. Patient currently denies any issues with vertigo or tinnitus. Physical examination with the microscope revealed the patient to have a normal ear canal bilaterally with TMs appear to be somewhat full to the left but not to the right. Tuning fork demonstrated [place] to remain midline. Neck exam demonstrated no lymphadenopathy or thyromegaly. Oral exam demonstrated the thrush to be absent with a normal-appearing tongue surface. The posterior pharynx was unremarkable. Impression: Clinically improving LPR with resolved oral thrush, perceived hearing loss of the left ear with type a tympanogram.    Plan: I have recommended the patient to see Pia for a full audiology screening due to her hearing changes. She is follow-up with me in 6 weeks for repeat laryngoscopy for her LPR. I advised the patient to continue her PPI therapy for another 6 weeks.       Electronically signed by Gallo Tolentino PA-C on 5/9/23 at 1:43 PM CDT

## 2023-05-09 NOTE — PROGRESS NOTES
Ms. Shelly Lora presented to the clinic this date for tympanometry due to complaints of left middle ear effusion. Type A tympanograms consistent with normal TM mobility bilaterally.

## 2023-06-09 ENCOUNTER — OFFICE VISIT (OUTPATIENT)
Dept: ENT CLINIC | Age: 73
End: 2023-06-09
Payer: MEDICARE

## 2023-06-09 ENCOUNTER — PROCEDURE VISIT (OUTPATIENT)
Dept: ENT CLINIC | Age: 73
End: 2023-06-09

## 2023-06-09 VITALS
WEIGHT: 174 LBS | SYSTOLIC BLOOD PRESSURE: 126 MMHG | HEIGHT: 64 IN | BODY MASS INDEX: 29.71 KG/M2 | DIASTOLIC BLOOD PRESSURE: 78 MMHG

## 2023-06-09 DIAGNOSIS — H65.92 MEE (MIDDLE EAR EFFUSION), LEFT: ICD-10-CM

## 2023-06-09 DIAGNOSIS — K21.9 LARYNGOPHARYNGEAL REFLUX (LPR): Primary | ICD-10-CM

## 2023-06-09 DIAGNOSIS — H93.8X2 SENSATION OF FULLNESS IN LEFT EAR: Primary | ICD-10-CM

## 2023-06-09 PROCEDURE — 3017F COLORECTAL CA SCREEN DOC REV: CPT | Performed by: PHYSICIAN ASSISTANT

## 2023-06-09 PROCEDURE — 1123F ACP DISCUSS/DSCN MKR DOCD: CPT | Performed by: PHYSICIAN ASSISTANT

## 2023-06-09 PROCEDURE — G8427 DOCREV CUR MEDS BY ELIG CLIN: HCPCS | Performed by: PHYSICIAN ASSISTANT

## 2023-06-09 PROCEDURE — G8400 PT W/DXA NO RESULTS DOC: HCPCS | Performed by: PHYSICIAN ASSISTANT

## 2023-06-09 PROCEDURE — 1090F PRES/ABSN URINE INCON ASSESS: CPT | Performed by: PHYSICIAN ASSISTANT

## 2023-06-09 PROCEDURE — 1036F TOBACCO NON-USER: CPT | Performed by: PHYSICIAN ASSISTANT

## 2023-06-09 PROCEDURE — G8417 CALC BMI ABV UP PARAM F/U: HCPCS | Performed by: PHYSICIAN ASSISTANT

## 2023-06-09 PROCEDURE — 99213 OFFICE O/P EST LOW 20 MIN: CPT | Performed by: PHYSICIAN ASSISTANT

## 2023-06-09 NOTE — PROGRESS NOTES
History   Ernesto Griffin is a 67 y.o. female who presented to the clinic this date with complaints of decreased hearing in her left ear. She was seen for tympanometry about a month ago. She had type A tympanograms at that time. She noted symptoms have improved slightly since that visit. Summary   Tympanometry consistent with normal TM mobility bilaterally. Pure tone testing indicates normal to moderately severe SNHL bilaterally. Based on degree of hearing loss, binaural hearing aids are recommended. Options for amplification were discussed. Results   Otoscopy:   Right: Clear EAC/Normal TM  Left: Clear EAC/Normal TM    Audiometry:   Right:  Normal to moderately severe SNHL     Left:  Normal to moderately severe SNHL            Tympanometry:    Right: Type A  Left: Type A      Plan   Results of today's testing were discussed with Ms. Lomax and the following recommendations were made: Follow up with ENT as scheduled. Hearing aid evaluation as desired. Monitor hearing yearly, sooner with changes. Hearing protection as warranted.       Audiogram and Acoustic Immittance

## 2023-06-09 NOTE — PROGRESS NOTES
Mrs. Nena Arrieta is a pleasant 76-year-old  female that presents for a 6-week follow-up after treatment for LPR. Patient reports that her reflux seems to be much improved. She also reports that the fullness that she was previously experiencing to the left ear has totally resolved. She is able to eat without any issues with dysphagia. Also denies any vocal hoarseness. Physical examination revealed the patient to have normal vocals with no hoarseness. The TMs appear to be normal as well as the canals bilaterally. Oral exam demonstrated the tongue to be midline with no abnormalities to the posterior pharynx. Neck exam demonstrated no lymphadenopathy or thyromegaly. Impression: Clinically resolved LPR, resolved left middle ear effusion    Plan: I have recommended the patient to decrease her Nexium to 20 mg the Nexium daily. Due to the patient doing well, she is follow-up with me as needed. She was reminded to call if she has any questions or problems.         Electronically signed by Colleen Love PA-C on 6/9/23 at 2:07 PM CDT

## 2023-08-22 DIAGNOSIS — I10 PRIMARY HYPERTENSION: ICD-10-CM

## 2023-08-22 DIAGNOSIS — R73.9 HYPERGLYCEMIA: ICD-10-CM

## 2023-08-22 DIAGNOSIS — E55.9 VITAMIN D DEFICIENCY: ICD-10-CM

## 2023-08-22 LAB
25(OH)D3 SERPL-MCNC: 71.1 NG/ML
ALBUMIN SERPL-MCNC: 4.1 G/DL (ref 3.5–5.2)
ALP SERPL-CCNC: 109 U/L (ref 35–104)
ALT SERPL-CCNC: 52 U/L (ref 5–33)
ANION GAP SERPL CALCULATED.3IONS-SCNC: 10 MMOL/L (ref 7–19)
AST SERPL-CCNC: 47 U/L (ref 5–32)
BASOPHILS # BLD: 0 K/UL (ref 0–0.2)
BASOPHILS NFR BLD: 0.5 % (ref 0–1)
BILIRUB SERPL-MCNC: 0.6 MG/DL (ref 0.2–1.2)
BUN SERPL-MCNC: 16 MG/DL (ref 8–23)
CALCIUM SERPL-MCNC: 9.7 MG/DL (ref 8.8–10.2)
CHLORIDE SERPL-SCNC: 106 MMOL/L (ref 98–111)
CHOLEST SERPL-MCNC: 148 MG/DL (ref 160–199)
CO2 SERPL-SCNC: 29 MMOL/L (ref 22–29)
CREAT SERPL-MCNC: 0.7 MG/DL (ref 0.5–0.9)
EOSINOPHIL # BLD: 0.2 K/UL (ref 0–0.6)
EOSINOPHIL NFR BLD: 2.9 % (ref 0–5)
ERYTHROCYTE [DISTWIDTH] IN BLOOD BY AUTOMATED COUNT: 13.5 % (ref 11.5–14.5)
GLUCOSE SERPL-MCNC: 99 MG/DL (ref 74–109)
HBA1C MFR BLD: 5.8 % (ref 4–6)
HCT VFR BLD AUTO: 44.7 % (ref 37–47)
HCV AB SERPL QL IA: NORMAL
HDLC SERPL-MCNC: 47 MG/DL (ref 65–121)
HGB BLD-MCNC: 14.3 G/DL (ref 12–16)
IMM GRANULOCYTES # BLD: 0 K/UL
LDLC SERPL CALC-MCNC: 83 MG/DL
LYMPHOCYTES # BLD: 2.9 K/UL (ref 1.1–4.5)
LYMPHOCYTES NFR BLD: 39.7 % (ref 20–40)
MCH RBC QN AUTO: 30.3 PG (ref 27–31)
MCHC RBC AUTO-ENTMCNC: 32 G/DL (ref 33–37)
MCV RBC AUTO: 94.7 FL (ref 81–99)
MONOCYTES # BLD: 0.5 K/UL (ref 0–0.9)
MONOCYTES NFR BLD: 7.1 % (ref 0–10)
NEUTROPHILS # BLD: 3.7 K/UL (ref 1.5–7.5)
NEUTS SEG NFR BLD: 49.5 % (ref 50–65)
PLATELET # BLD AUTO: 205 K/UL (ref 130–400)
PMV BLD AUTO: 9.8 FL (ref 9.4–12.3)
POTASSIUM SERPL-SCNC: 4.8 MMOL/L (ref 3.5–5)
PROT SERPL-MCNC: 6.6 G/DL (ref 6.6–8.7)
RBC # BLD AUTO: 4.72 M/UL (ref 4.2–5.4)
SODIUM SERPL-SCNC: 145 MMOL/L (ref 136–145)
TRIGL SERPL-MCNC: 91 MG/DL (ref 0–149)
TSH SERPL DL<=0.005 MIU/L-ACNC: 1.33 UIU/ML (ref 0.27–4.2)
WBC # BLD AUTO: 7.4 K/UL (ref 4.8–10.8)

## 2023-08-29 ENCOUNTER — OFFICE VISIT (OUTPATIENT)
Dept: INTERNAL MEDICINE | Age: 73
End: 2023-08-29
Payer: MEDICARE

## 2023-08-29 ENCOUNTER — TELEPHONE (OUTPATIENT)
Dept: INTERNAL MEDICINE | Age: 73
End: 2023-08-29

## 2023-08-29 VITALS
DIASTOLIC BLOOD PRESSURE: 80 MMHG | OXYGEN SATURATION: 97 % | WEIGHT: 177 LBS | HEART RATE: 65 BPM | HEIGHT: 64 IN | BODY MASS INDEX: 30.22 KG/M2 | RESPIRATION RATE: 20 BRPM | SYSTOLIC BLOOD PRESSURE: 138 MMHG

## 2023-08-29 DIAGNOSIS — K21.9 GASTROESOPHAGEAL REFLUX DISEASE WITHOUT ESOPHAGITIS: ICD-10-CM

## 2023-08-29 DIAGNOSIS — E55.9 VITAMIN D DEFICIENCY: ICD-10-CM

## 2023-08-29 DIAGNOSIS — Z00.00 ROUTINE HEALTH MAINTENANCE: Primary | ICD-10-CM

## 2023-08-29 DIAGNOSIS — Z78.0 MENOPAUSE: ICD-10-CM

## 2023-08-29 DIAGNOSIS — Z91.09 ENVIRONMENTAL ALLERGIES: ICD-10-CM

## 2023-08-29 DIAGNOSIS — R73.9 HYPERGLYCEMIA: ICD-10-CM

## 2023-08-29 DIAGNOSIS — R22.32 MASS OF LEFT AXILLA: ICD-10-CM

## 2023-08-29 DIAGNOSIS — I10 PRIMARY HYPERTENSION: ICD-10-CM

## 2023-08-29 DIAGNOSIS — E78.5 HYPERLIPIDEMIA, UNSPECIFIED HYPERLIPIDEMIA TYPE: ICD-10-CM

## 2023-08-29 DIAGNOSIS — Z12.31 ENCOUNTER FOR SCREENING MAMMOGRAM FOR MALIGNANT NEOPLASM OF BREAST: ICD-10-CM

## 2023-08-29 PROCEDURE — 3074F SYST BP LT 130 MM HG: CPT | Performed by: INTERNAL MEDICINE

## 2023-08-29 PROCEDURE — 3078F DIAST BP <80 MM HG: CPT | Performed by: INTERNAL MEDICINE

## 2023-08-29 PROCEDURE — 1123F ACP DISCUSS/DSCN MKR DOCD: CPT | Performed by: INTERNAL MEDICINE

## 2023-08-29 PROCEDURE — 3017F COLORECTAL CA SCREEN DOC REV: CPT | Performed by: INTERNAL MEDICINE

## 2023-08-29 PROCEDURE — G0439 PPPS, SUBSEQ VISIT: HCPCS | Performed by: INTERNAL MEDICINE

## 2023-08-29 RX ORDER — METOPROLOL SUCCINATE 100 MG/1
50 TABLET, EXTENDED RELEASE ORAL 2 TIMES DAILY
Qty: 90 TABLET | Refills: 3 | Status: SHIPPED | OUTPATIENT
Start: 2023-08-29 | End: 2023-09-11 | Stop reason: SDUPTHER

## 2023-08-29 RX ORDER — LOSARTAN POTASSIUM 25 MG/1
25 TABLET ORAL DAILY
Qty: 90 TABLET | Refills: 3 | Status: SHIPPED | OUTPATIENT
Start: 2023-08-29 | End: 2023-09-11 | Stop reason: SDUPTHER

## 2023-08-29 SDOH — ECONOMIC STABILITY: FOOD INSECURITY: WITHIN THE PAST 12 MONTHS, YOU WORRIED THAT YOUR FOOD WOULD RUN OUT BEFORE YOU GOT MONEY TO BUY MORE.: NEVER TRUE

## 2023-08-29 SDOH — ECONOMIC STABILITY: HOUSING INSECURITY
IN THE LAST 12 MONTHS, WAS THERE A TIME WHEN YOU DID NOT HAVE A STEADY PLACE TO SLEEP OR SLEPT IN A SHELTER (INCLUDING NOW)?: NO

## 2023-08-29 SDOH — ECONOMIC STABILITY: FOOD INSECURITY: WITHIN THE PAST 12 MONTHS, THE FOOD YOU BOUGHT JUST DIDN'T LAST AND YOU DIDN'T HAVE MONEY TO GET MORE.: NEVER TRUE

## 2023-08-29 SDOH — ECONOMIC STABILITY: INCOME INSECURITY: HOW HARD IS IT FOR YOU TO PAY FOR THE VERY BASICS LIKE FOOD, HOUSING, MEDICAL CARE, AND HEATING?: NOT HARD AT ALL

## 2023-08-29 ASSESSMENT — LIFESTYLE VARIABLES
HOW OFTEN DO YOU HAVE A DRINK CONTAINING ALCOHOL: NEVER
HOW MANY STANDARD DRINKS CONTAINING ALCOHOL DO YOU HAVE ON A TYPICAL DAY: PATIENT DOES NOT DRINK

## 2023-08-29 ASSESSMENT — PATIENT HEALTH QUESTIONNAIRE - PHQ9
SUM OF ALL RESPONSES TO PHQ9 QUESTIONS 1 & 2: 0
1. LITTLE INTEREST OR PLEASURE IN DOING THINGS: 0
SUM OF ALL RESPONSES TO PHQ QUESTIONS 1-9: 0
2. FEELING DOWN, DEPRESSED OR HOPELESS: 0
SUM OF ALL RESPONSES TO PHQ QUESTIONS 1-9: 0

## 2023-08-29 NOTE — TELEPHONE ENCOUNTER
Patient called stating prescriptions that were sent to [facility] need to be cancelled and sent to [address]

## 2023-08-29 NOTE — PROGRESS NOTES
use, food preparation, transportation, or taking medications?: No  ADL Interventions:  Patient declines any further evaluation/treatment for this issue    Personalized Preventive Plan   Current Health Maintenance Status  Immunization History   Administered Date(s) Administered    COVID-19, MODERNA BLUE border, Primary or Immunocompromised, (age 12y+), IM, 100 mcg/0.5mL 02/26/2021, 03/26/2021    COVID-19, MODERNA Booster BLUE border, (age 18y+), IM, 50mcg/0.25mL 11/13/2021    Influenza, High Dose (Fluzone 65 yrs and older) 10/20/2020, 10/12/2021    Pneumococcal, PCV-13, PREVNAR 15, (age 6w+), IM, 0.5mL 10/18/2015    Pneumococcal, PPSV23, PNEUMOVAX 21, (age 2y+), SC/IM, 0.5mL 10/03/2016        Health Maintenance   Topic Date Due    DTaP/Tdap/Td vaccine (1 - Tdap) Never done    Colorectal Cancer Screen  Never done    COVID-19 Vaccine (4 - Moderna series) 01/08/2022    Annual Wellness Visit (AWV)  07/27/2023    Flu vaccine (1) 08/01/2023    Breast cancer screen  08/04/2024    A1C test (Diabetic or Prediabetic)  08/22/2024    Depression Screen  08/29/2024    Lipids  08/22/2028    DEXA (modify frequency per FRAX score)  Completed    Shingles vaccine  Completed    Pneumococcal 65+ years Vaccine  Completed    Hepatitis C screen  Completed    Hepatitis A vaccine  Aged Out    Hepatitis B vaccine  Aged Out    Hib vaccine  Aged Out    Meningococcal (ACWY) vaccine  Aged Out       Recommendations for [place] Due: see orders.   Recommended screening schedule for the next 5-10 years is provided to the patient in written form: see Patient Instructions/AVS.

## 2023-08-30 ENCOUNTER — TRANSCRIBE ORDERS (OUTPATIENT)
Dept: ADMINISTRATIVE | Facility: HOSPITAL | Age: 73
End: 2023-08-30
Payer: MEDICARE

## 2023-08-30 DIAGNOSIS — Z78.0 MENOPAUSE: ICD-10-CM

## 2023-08-30 DIAGNOSIS — Z12.31 ENCOUNTER FOR SCREENING MAMMOGRAM FOR MALIGNANT NEOPLASM OF BREAST: Primary | ICD-10-CM

## 2023-08-31 ENCOUNTER — TRANSCRIBE ORDERS (OUTPATIENT)
Dept: ADMINISTRATIVE | Facility: HOSPITAL | Age: 73
End: 2023-08-31
Payer: MEDICARE

## 2023-08-31 DIAGNOSIS — R22.32 AXILLARY MASS, LEFT: ICD-10-CM

## 2023-08-31 DIAGNOSIS — R22.32 MASS OF AXILLA, LEFT: Primary | ICD-10-CM

## 2023-09-08 ENCOUNTER — TELEPHONE (OUTPATIENT)
Dept: INTERNAL MEDICINE | Age: 73
End: 2023-09-08

## 2023-09-08 NOTE — TELEPHONE ENCOUNTER
----- Message from Cheryle Record sent at 9/8/2023  9:12 AM CDT -----  Subject: Message to Provider    QUESTIONS  Information for Provider? Goldie from the Hospital stating that they need a   new diagnosis code for the Ultrasound of the Left Breast. Current code is   not Medicare compliant.  ---------------------------------------------------------------------------  --------------  [facility]  [phone]; OK to leave message on voicemail  ---------------------------------------------------------------------------  --------------  SCRIPT ANSWERS  Relationship to Patient? Covered Entity  Covered Entity Type? Hospital?  Representative Name?  Babita Recinos

## 2023-09-08 NOTE — TELEPHONE ENCOUNTER
They are stating the diagnosis code is not covered. I do not know what else they want and they are unable to give suggestions are the requirements. Called number provided [phone]. They were unable to locate patient in system. Please advise.

## 2023-09-11 ENCOUNTER — HOSPITAL ENCOUNTER (OUTPATIENT)
Dept: BONE DENSITY | Facility: HOSPITAL | Age: 73
Discharge: HOME | End: 2023-09-11
Payer: MEDICARE

## 2023-09-11 ENCOUNTER — HOSPITAL ENCOUNTER (OUTPATIENT)
Dept: MAMMOGRAPHY | Facility: HOSPITAL | Age: 73
Discharge: HOME | End: 2023-09-11
Payer: MEDICARE

## 2023-09-11 ENCOUNTER — TELEPHONE (OUTPATIENT)
Dept: INTERNAL MEDICINE | Age: 73
End: 2023-09-11

## 2023-09-11 ENCOUNTER — HOSPITAL ENCOUNTER (OUTPATIENT)
Dept: ULTRASOUND IMAGING | Facility: HOSPITAL | Age: 73
Discharge: HOME | End: 2023-09-11
Payer: MEDICARE

## 2023-09-11 ENCOUNTER — TRANSCRIBE ORDERS (OUTPATIENT)
Dept: ADMINISTRATIVE | Facility: HOSPITAL | Age: 73
End: 2023-09-11
Payer: MEDICARE

## 2023-09-11 DIAGNOSIS — N63.32 MASS OF AXILLARY TAIL OF LEFT BREAST: Primary | ICD-10-CM

## 2023-09-11 DIAGNOSIS — N63.32 LUMP OF AXILLARY TAIL OF LEFT BREAST: ICD-10-CM

## 2023-09-11 DIAGNOSIS — I10 PRIMARY HYPERTENSION: ICD-10-CM

## 2023-09-11 DIAGNOSIS — Z12.31 ENCOUNTER FOR SCREENING MAMMOGRAM FOR MALIGNANT NEOPLASM OF BREAST: ICD-10-CM

## 2023-09-11 DIAGNOSIS — N63.32 LUMP OF AXILLARY TAIL OF LEFT BREAST: Primary | ICD-10-CM

## 2023-09-11 DIAGNOSIS — R92.2 INCONCLUSIVE MAMMOGRAM: ICD-10-CM

## 2023-09-11 DIAGNOSIS — N63.0 LUMP IN FEMALE BREAST: Primary | ICD-10-CM

## 2023-09-11 DIAGNOSIS — Z78.0 MENOPAUSE: ICD-10-CM

## 2023-09-11 DIAGNOSIS — K21.9 GASTROESOPHAGEAL REFLUX DISEASE WITHOUT ESOPHAGITIS: Primary | ICD-10-CM

## 2023-09-11 DIAGNOSIS — N63.0 MASS OF BREAST, UNSPECIFIED LATERALITY: ICD-10-CM

## 2023-09-11 DIAGNOSIS — N63.20 MASS OF LEFT BREAST, UNSPECIFIED QUADRANT: ICD-10-CM

## 2023-09-11 PROCEDURE — G0279 TOMOSYNTHESIS, MAMMO: HCPCS

## 2023-09-11 PROCEDURE — 77080 DXA BONE DENSITY AXIAL: CPT

## 2023-09-11 PROCEDURE — 77066 DX MAMMO INCL CAD BI: CPT

## 2023-09-11 PROCEDURE — 76882 US LMTD JT/FCL EVL NVASC XTR: CPT

## 2023-09-11 RX ORDER — LOSARTAN POTASSIUM 25 MG/1
25 TABLET ORAL DAILY
Qty: 90 TABLET | Refills: 3 | Status: SHIPPED | OUTPATIENT
Start: 2023-09-11

## 2023-09-11 RX ORDER — METOPROLOL SUCCINATE 100 MG/1
50 TABLET, EXTENDED RELEASE ORAL 2 TIMES DAILY
Qty: 90 TABLET | Refills: 3 | Status: SHIPPED | OUTPATIENT
Start: 2023-09-11

## 2023-09-11 ASSESSMENT — ENCOUNTER SYMPTOMS
CONSTIPATION: 0
BLOOD IN STOOL: 0
EYE REDNESS: 0
TROUBLE SWALLOWING: 0
SORE THROAT: 0
VOICE CHANGE: 0
EYE ITCHING: 0
PHOTOPHOBIA: 0
SINUS PRESSURE: 0
ABDOMINAL DISTENTION: 0
SINUS PAIN: 0
RECTAL PAIN: 0
ANAL BLEEDING: 0
COUGH: 0
RHINORRHEA: 0
FACIAL SWELLING: 0
EYE DISCHARGE: 0
CHOKING: 0
DIARRHEA: 0
EYE PAIN: 0
CHEST TIGHTNESS: 0
SHORTNESS OF BREATH: 0
COLOR CHANGE: 0
BACK PAIN: 0
VOMITING: 0
NAUSEA: 0
ABDOMINAL PAIN: 0
WHEEZING: 0

## 2023-09-13 DIAGNOSIS — Z78.0 MENOPAUSE: ICD-10-CM

## 2023-09-13 DIAGNOSIS — N63.32 MASS OF AXILLARY TAIL OF LEFT BREAST: ICD-10-CM

## 2023-09-14 ENCOUNTER — TELEPHONE (OUTPATIENT)
Dept: INTERNAL MEDICINE | Age: 73
End: 2023-09-14

## 2023-09-14 NOTE — TELEPHONE ENCOUNTER
Mammogram is okay. That spot just shows some benign-appearing lymph nodes. Sure we get her mammogram attached to her health maintenance. DEXA shows osteopenia.  Calcium and vitamin D daily

## 2023-11-27 ENCOUNTER — TELEPHONE (OUTPATIENT)
Dept: INTERNAL MEDICINE | Age: 73
End: 2023-11-27

## 2023-11-27 DIAGNOSIS — K21.9 GASTROESOPHAGEAL REFLUX DISEASE WITHOUT ESOPHAGITIS: ICD-10-CM

## 2024-02-20 DIAGNOSIS — Z00.00 ROUTINE HEALTH MAINTENANCE: ICD-10-CM

## 2024-02-20 DIAGNOSIS — E78.5 HYPERLIPIDEMIA, UNSPECIFIED HYPERLIPIDEMIA TYPE: ICD-10-CM

## 2024-02-20 DIAGNOSIS — R73.9 HYPERGLYCEMIA: ICD-10-CM

## 2024-02-20 DIAGNOSIS — E55.9 VITAMIN D DEFICIENCY: ICD-10-CM

## 2024-02-20 DIAGNOSIS — I10 PRIMARY HYPERTENSION: ICD-10-CM

## 2024-02-20 LAB
25(OH)D3 SERPL-MCNC: 77.3 NG/ML
ALBUMIN SERPL-MCNC: 4.5 G/DL (ref 3.5–5.2)
ALP SERPL-CCNC: 119 U/L (ref 35–104)
ALT SERPL-CCNC: 59 U/L (ref 5–33)
ANION GAP SERPL CALCULATED.3IONS-SCNC: 13 MMOL/L (ref 7–19)
AST SERPL-CCNC: 58 U/L (ref 5–32)
BASOPHILS # BLD: 0 K/UL (ref 0–0.2)
BASOPHILS NFR BLD: 0.5 % (ref 0–1)
BILIRUB SERPL-MCNC: 0.5 MG/DL (ref 0.2–1.2)
BUN SERPL-MCNC: 13 MG/DL (ref 8–23)
CALCIUM SERPL-MCNC: 9.7 MG/DL (ref 8.8–10.2)
CHLORIDE SERPL-SCNC: 105 MMOL/L (ref 98–111)
CHOLEST SERPL-MCNC: 154 MG/DL (ref 160–199)
CO2 SERPL-SCNC: 27 MMOL/L (ref 22–29)
CREAT SERPL-MCNC: 0.7 MG/DL (ref 0.5–0.9)
EOSINOPHIL # BLD: 0.3 K/UL (ref 0–0.6)
EOSINOPHIL NFR BLD: 3.7 % (ref 0–5)
ERYTHROCYTE [DISTWIDTH] IN BLOOD BY AUTOMATED COUNT: 13.9 % (ref 11.5–14.5)
GLUCOSE SERPL-MCNC: 99 MG/DL (ref 74–109)
HBA1C MFR BLD: 5.8 % (ref 4–6)
HCT VFR BLD AUTO: 46.2 % (ref 37–47)
HDLC SERPL-MCNC: 47 MG/DL (ref 65–121)
HGB BLD-MCNC: 14.8 G/DL (ref 12–16)
IMM GRANULOCYTES # BLD: 0 K/UL
LDLC SERPL CALC-MCNC: 84 MG/DL
LYMPHOCYTES # BLD: 2.9 K/UL (ref 1.1–4.5)
LYMPHOCYTES NFR BLD: 39.9 % (ref 20–40)
MCH RBC QN AUTO: 30.1 PG (ref 27–31)
MCHC RBC AUTO-ENTMCNC: 32 G/DL (ref 33–37)
MCV RBC AUTO: 94.1 FL (ref 81–99)
MONOCYTES # BLD: 0.6 K/UL (ref 0–0.9)
MONOCYTES NFR BLD: 7.6 % (ref 0–10)
NEUTROPHILS # BLD: 3.5 K/UL (ref 1.5–7.5)
NEUTS SEG NFR BLD: 48.2 % (ref 50–65)
PLATELET # BLD AUTO: 217 K/UL (ref 130–400)
PMV BLD AUTO: 10.1 FL (ref 9.4–12.3)
POTASSIUM SERPL-SCNC: 4.4 MMOL/L (ref 3.5–5)
PROT SERPL-MCNC: 6.6 G/DL (ref 6.6–8.7)
RBC # BLD AUTO: 4.91 M/UL (ref 4.2–5.4)
SODIUM SERPL-SCNC: 145 MMOL/L (ref 136–145)
TRIGL SERPL-MCNC: 114 MG/DL (ref 0–149)
TSH SERPL DL<=0.005 MIU/L-ACNC: 1.71 UIU/ML (ref 0.27–4.2)
WBC # BLD AUTO: 7.3 K/UL (ref 4.8–10.8)

## 2024-02-29 ENCOUNTER — OFFICE VISIT (OUTPATIENT)
Dept: INTERNAL MEDICINE | Age: 74
End: 2024-02-29
Payer: MEDICARE

## 2024-02-29 VITALS
HEART RATE: 69 BPM | HEIGHT: 64 IN | WEIGHT: 178 LBS | DIASTOLIC BLOOD PRESSURE: 80 MMHG | BODY MASS INDEX: 30.39 KG/M2 | OXYGEN SATURATION: 93 % | SYSTOLIC BLOOD PRESSURE: 135 MMHG

## 2024-02-29 DIAGNOSIS — M25.50 ARTHRALGIA, UNSPECIFIED JOINT: ICD-10-CM

## 2024-02-29 DIAGNOSIS — Z29.11 NEED FOR PROPHYLACTIC VACCINATION AND INOCULATION AGAINST RESPIRATORY SYNCYTIAL VIRUS (RSV): ICD-10-CM

## 2024-02-29 DIAGNOSIS — E53.8 B12 DEFICIENCY: ICD-10-CM

## 2024-02-29 DIAGNOSIS — E55.9 VITAMIN D DEFICIENCY: ICD-10-CM

## 2024-02-29 DIAGNOSIS — Z91.09 ENVIRONMENTAL ALLERGIES: ICD-10-CM

## 2024-02-29 DIAGNOSIS — K21.9 GASTROESOPHAGEAL REFLUX DISEASE WITHOUT ESOPHAGITIS: ICD-10-CM

## 2024-02-29 DIAGNOSIS — R68.2 DRY MOUTH: ICD-10-CM

## 2024-02-29 DIAGNOSIS — I10 PRIMARY HYPERTENSION: Primary | ICD-10-CM

## 2024-02-29 DIAGNOSIS — R73.9 HYPERGLYCEMIA: ICD-10-CM

## 2024-02-29 DIAGNOSIS — R59.0 AXILLARY ADENOPATHY: ICD-10-CM

## 2024-02-29 DIAGNOSIS — R74.8 ELEVATED LIVER ENZYMES: ICD-10-CM

## 2024-02-29 LAB
ALBUMIN SERPL-MCNC: 4.4 G/DL (ref 3.5–5.2)
ALP SERPL-CCNC: 116 U/L (ref 35–104)
ALT SERPL-CCNC: 62 U/L (ref 5–33)
AST SERPL-CCNC: 60 U/L (ref 5–32)
BILIRUB DIRECT SERPL-MCNC: 0.1 MG/DL (ref 0–0.3)
BILIRUB INDIRECT SERPL-MCNC: 0.4 MG/DL (ref 0.1–1)
BILIRUB SERPL-MCNC: 0.5 MG/DL (ref 0.2–1.2)
CRP SERPL HS-MCNC: 0.44 MG/DL (ref 0–0.5)
ERYTHROCYTE [SEDIMENTATION RATE] IN BLOOD BY WESTERGREN METHOD: 11 MM/HR (ref 0–25)
PROT SERPL-MCNC: 6.6 G/DL (ref 6.6–8.7)
RHEUMATOID FACT SER NEPH-ACNC: <10 IU/ML

## 2024-02-29 PROCEDURE — 1036F TOBACCO NON-USER: CPT | Performed by: INTERNAL MEDICINE

## 2024-02-29 PROCEDURE — 3079F DIAST BP 80-89 MM HG: CPT | Performed by: INTERNAL MEDICINE

## 2024-02-29 PROCEDURE — G9899 SCRN MAM PERF RSLTS DOC: HCPCS | Performed by: INTERNAL MEDICINE

## 2024-02-29 PROCEDURE — 3017F COLORECTAL CA SCREEN DOC REV: CPT | Performed by: INTERNAL MEDICINE

## 2024-02-29 PROCEDURE — 3075F SYST BP GE 130 - 139MM HG: CPT | Performed by: INTERNAL MEDICINE

## 2024-02-29 PROCEDURE — G8417 CALC BMI ABV UP PARAM F/U: HCPCS | Performed by: INTERNAL MEDICINE

## 2024-02-29 PROCEDURE — G8399 PT W/DXA RESULTS DOCUMENT: HCPCS | Performed by: INTERNAL MEDICINE

## 2024-02-29 PROCEDURE — 1090F PRES/ABSN URINE INCON ASSESS: CPT | Performed by: INTERNAL MEDICINE

## 2024-02-29 PROCEDURE — G8427 DOCREV CUR MEDS BY ELIG CLIN: HCPCS | Performed by: INTERNAL MEDICINE

## 2024-02-29 PROCEDURE — G8484 FLU IMMUNIZE NO ADMIN: HCPCS | Performed by: INTERNAL MEDICINE

## 2024-02-29 PROCEDURE — 99214 OFFICE O/P EST MOD 30 MIN: CPT | Performed by: INTERNAL MEDICINE

## 2024-02-29 PROCEDURE — 1123F ACP DISCUSS/DSCN MKR DOCD: CPT | Performed by: INTERNAL MEDICINE

## 2024-02-29 RX ORDER — LOSARTAN POTASSIUM 25 MG/1
25 TABLET ORAL DAILY
Qty: 90 TABLET | Refills: 3 | Status: SHIPPED | OUTPATIENT
Start: 2024-02-29 | End: 2024-02-29 | Stop reason: SDUPTHER

## 2024-02-29 RX ORDER — LOSARTAN POTASSIUM 50 MG/1
50 TABLET ORAL DAILY
Qty: 90 TABLET | Refills: 1 | Status: SHIPPED | OUTPATIENT
Start: 2024-02-29 | End: 2024-03-01 | Stop reason: SDUPTHER

## 2024-02-29 RX ORDER — HYDROCHLOROTHIAZIDE 12.5 MG/1
12.5 CAPSULE, GELATIN COATED ORAL EVERY MORNING
Qty: 30 CAPSULE | Refills: 1 | Status: SHIPPED | OUTPATIENT
Start: 2024-02-29 | End: 2024-02-29 | Stop reason: SDUPTHER

## 2024-02-29 RX ORDER — LOSARTAN POTASSIUM 50 MG/1
50 TABLET ORAL DAILY
Qty: 90 TABLET | Refills: 1 | Status: SHIPPED | OUTPATIENT
Start: 2024-02-29 | End: 2024-02-29 | Stop reason: SDUPTHER

## 2024-02-29 RX ORDER — METOPROLOL SUCCINATE 100 MG/1
50 TABLET, EXTENDED RELEASE ORAL 2 TIMES DAILY
Qty: 90 TABLET | Refills: 3 | Status: SHIPPED | OUTPATIENT
Start: 2024-02-29 | End: 2024-02-29 | Stop reason: SDUPTHER

## 2024-02-29 RX ORDER — METOPROLOL SUCCINATE 100 MG/1
50 TABLET, EXTENDED RELEASE ORAL 2 TIMES DAILY
Qty: 90 TABLET | Refills: 3 | Status: SHIPPED | OUTPATIENT
Start: 2024-02-29 | End: 2024-03-01 | Stop reason: SDUPTHER

## 2024-02-29 RX ORDER — HYDROCHLOROTHIAZIDE 12.5 MG/1
12.5 CAPSULE, GELATIN COATED ORAL EVERY MORNING
Qty: 30 CAPSULE | Refills: 1 | Status: SHIPPED | OUTPATIENT
Start: 2024-02-29 | End: 2024-03-01 | Stop reason: SDUPTHER

## 2024-02-29 ASSESSMENT — PATIENT HEALTH QUESTIONNAIRE - PHQ9
SUM OF ALL RESPONSES TO PHQ9 QUESTIONS 1 & 2: 0
SUM OF ALL RESPONSES TO PHQ QUESTIONS 1-9: 0
1. LITTLE INTEREST OR PLEASURE IN DOING THINGS: 0
2. FEELING DOWN, DEPRESSED OR HOPELESS: 0
SUM OF ALL RESPONSES TO PHQ QUESTIONS 1-9: 0

## 2024-02-29 NOTE — PROGRESS NOTES
Chief Complaint   Patient presents with    Follow-up     Pt interested in having blood work done for arthritis, pt having some joint issues   Refills on bp meds and acid reflux   Pt asking about blood pressure sometimes increasing and wondering if it is from anxiety       HPI: Annie Lomax is a 73 y.o. female is here for evaluation of acid reflux, environmental allergies, hypertension and vitamin D deficiency.  She also would like a prescription for the RSV vaccine.    She states that her blood pressure becomes elevated to 155/90 3 at night.  Today, is 135/80.  She states that at nighttime, she can sometimes get very anxious.  However, most of the time, her blood pressure is well-controlled.  She denies any complaints of chest pain, chest pressure or shortness of breath.    She states that her last colonoscopy was done at [facility].  Will check on this.  She is concerned about her elevated liver enzymes.  We will recheck those in 1 month.  She has been taking some over-the-counter cold medication.  She states that she has had some mild sinus drainage recently.  She still has a gland underneath her right armpit that is enlarged.  She is due for repeat ultrasound of the area.  Will go ahead and order this today.  She states that [facility] told her that we coded it the wrong way the last time that this was ordered.  As far as I know [facility] did not even reach out to us on that.    Her acid reflux is well-controlled.  Her allergies are stable.  She does complain of generalized arthralgias.  She like to be tested for rheumatoid arthritis.  She also complains of a dry mouth at times.      She does have a history of vitamin D deficiency and is on vitamin D supplementation.    Past Medical History:   Diagnosis Date    GERD (gastroesophageal reflux disease)     Hypertension     Other hemorrhoids       Past Surgical History:   Procedure Laterality Date    BLADDER SURGERY  09/2011    bladder

## 2024-03-01 RX ORDER — METOPROLOL SUCCINATE 100 MG/1
50 TABLET, EXTENDED RELEASE ORAL 2 TIMES DAILY
Qty: 90 TABLET | Refills: 3 | Status: SHIPPED | OUTPATIENT
Start: 2024-03-01

## 2024-03-01 RX ORDER — HYDROCHLOROTHIAZIDE 12.5 MG/1
12.5 CAPSULE, GELATIN COATED ORAL EVERY MORNING
Qty: 30 CAPSULE | Refills: 1 | Status: SHIPPED | OUTPATIENT
Start: 2024-03-01

## 2024-03-01 RX ORDER — LOSARTAN POTASSIUM 50 MG/1
50 TABLET ORAL DAILY
Qty: 90 TABLET | Refills: 1 | Status: SHIPPED | OUTPATIENT
Start: 2024-03-01

## 2024-03-03 LAB
CCP IGA+IGG SERPL IA-ACNC: 3 UNITS (ref 0–19)
NUCLEAR IGG SER QL IA: NORMAL

## 2024-03-06 ENCOUNTER — TELEPHONE (OUTPATIENT)
Dept: INTERNAL MEDICINE | Age: 74
End: 2024-03-06

## 2024-03-06 ASSESSMENT — ENCOUNTER SYMPTOMS
SHORTNESS OF BREATH: 0
SORE THROAT: 0
NAUSEA: 0
RECTAL PAIN: 0
DIARRHEA: 0
SINUS PRESSURE: 0
EYE PAIN: 0
TROUBLE SWALLOWING: 0
COUGH: 0
EYE ITCHING: 0
COLOR CHANGE: 0
PHOTOPHOBIA: 0
EYE REDNESS: 0
VOMITING: 0
FACIAL SWELLING: 0
ABDOMINAL DISTENTION: 0
BLOOD IN STOOL: 0
ANAL BLEEDING: 0
SINUS PAIN: 0
CHEST TIGHTNESS: 0
WHEEZING: 0
VOICE CHANGE: 0
RHINORRHEA: 0
ABDOMINAL PAIN: 0
CONSTIPATION: 0
BACK PAIN: 0
CHOKING: 0
EYE DISCHARGE: 0

## 2024-03-12 ENCOUNTER — TELEPHONE (OUTPATIENT)
Dept: INTERNAL MEDICINE | Age: 74
End: 2024-03-12

## 2024-03-12 DIAGNOSIS — R59.0 AXILLARY ADENOPATHY: Primary | ICD-10-CM

## 2024-03-12 DIAGNOSIS — R74.8 ELEVATED LIVER ENZYMES: Primary | ICD-10-CM

## 2024-03-12 NOTE — TELEPHONE ENCOUNTER
Patient called stating that they was to be scheduled for an ultrasound but needed to have a diagnostic mammogram with it and was wanting to speak with provider about procedure

## 2024-03-13 ENCOUNTER — TRANSCRIBE ORDERS (OUTPATIENT)
Dept: ADMINISTRATIVE | Facility: HOSPITAL | Age: 74
End: 2024-03-13
Payer: MEDICARE

## 2024-03-13 DIAGNOSIS — N63.32 MASS OF AXILLARY TAIL OF LEFT BREAST: ICD-10-CM

## 2024-03-13 DIAGNOSIS — R22.30 AXILLARY MASS, UNSPECIFIED LATERALITY: Primary | ICD-10-CM

## 2024-03-13 DIAGNOSIS — R59.0 AXILLARY ADENOPATHY: ICD-10-CM

## 2024-03-13 DIAGNOSIS — R59.0 AXILLARY ADENOPATHY: Primary | ICD-10-CM

## 2024-03-13 DIAGNOSIS — N63.32 LUMP OF AXILLARY TAIL OF LEFT BREAST: ICD-10-CM

## 2024-03-28 DIAGNOSIS — R74.8 ELEVATED LIVER ENZYMES: ICD-10-CM

## 2024-03-28 DIAGNOSIS — R74.8 ELEVATED LIVER ENZYMES: Primary | ICD-10-CM

## 2024-03-28 LAB
ALBUMIN SERPL-MCNC: 4.2 G/DL (ref 3.5–5.2)
ALP SERPL-CCNC: 115 U/L (ref 35–104)
ALT SERPL-CCNC: 66 U/L (ref 5–33)
AST SERPL-CCNC: 60 U/L (ref 5–32)
BILIRUB DIRECT SERPL-MCNC: 0.2 MG/DL (ref 0–0.3)
BILIRUB INDIRECT SERPL-MCNC: 0.3 MG/DL (ref 0.1–1)
BILIRUB SERPL-MCNC: 0.5 MG/DL (ref 0.2–1.2)
PROT SERPL-MCNC: 6.5 G/DL (ref 6.6–8.7)

## 2024-04-30 DIAGNOSIS — R74.8 ELEVATED LIVER ENZYMES: ICD-10-CM

## 2024-04-30 LAB
ALBUMIN SERPL-MCNC: 4.1 G/DL (ref 3.5–5.2)
ALP SERPL-CCNC: 102 U/L (ref 35–104)
ALT SERPL-CCNC: 67 U/L (ref 5–33)
AST SERPL-CCNC: 74 U/L (ref 5–32)
BILIRUB DIRECT SERPL-MCNC: 0.2 MG/DL (ref 0–0.3)
BILIRUB INDIRECT SERPL-MCNC: 0.4 MG/DL (ref 0.1–1)
BILIRUB SERPL-MCNC: 0.6 MG/DL (ref 0.2–1.2)
PROT SERPL-MCNC: 6.4 G/DL (ref 6.6–8.7)

## 2024-05-10 ENCOUNTER — HOSPITAL ENCOUNTER (OUTPATIENT)
Dept: MAMMOGRAPHY | Facility: HOSPITAL | Age: 74
Discharge: HOME | End: 2024-05-10
Payer: MEDICARE

## 2024-05-10 ENCOUNTER — HOSPITAL ENCOUNTER (OUTPATIENT)
Dept: ULTRASOUND IMAGING | Facility: HOSPITAL | Age: 74
Discharge: HOME | End: 2024-05-10
Payer: MEDICARE

## 2024-05-10 DIAGNOSIS — R59.0 AXILLARY ADENOPATHY: ICD-10-CM

## 2024-05-10 DIAGNOSIS — N63.32 LUMP OF AXILLARY TAIL OF LEFT BREAST: ICD-10-CM

## 2024-05-10 PROCEDURE — 77065 DX MAMMO INCL CAD UNI: CPT

## 2024-05-10 PROCEDURE — 76642 ULTRASOUND BREAST LIMITED: CPT

## 2024-05-10 PROCEDURE — 76641 ULTRASOUND BREAST COMPLETE: CPT

## 2024-05-10 PROCEDURE — G0279 TOMOSYNTHESIS, MAMMO: HCPCS

## 2024-05-31 ENCOUNTER — TELEPHONE (OUTPATIENT)
Dept: INTERNAL MEDICINE | Age: 74
End: 2024-05-31

## 2024-05-31 DIAGNOSIS — K21.9 GASTROESOPHAGEAL REFLUX DISEASE WITHOUT ESOPHAGITIS: ICD-10-CM

## 2024-05-31 NOTE — TELEPHONE ENCOUNTER
Patient calling 5/31/24 requesting a refill of their esomeprazole (NEXIUM) 20 MG delayed release capsule  medication. Patient would like it sent to  [facility] [place] - [place] [facility] - P [phone] - F [phone] .

## 2024-07-15 DIAGNOSIS — I10 PRIMARY HYPERTENSION: ICD-10-CM

## 2024-07-15 RX ORDER — LOSARTAN POTASSIUM 50 MG/1
50 TABLET ORAL DAILY
Qty: 90 TABLET | Refills: 1 | Status: SHIPPED | OUTPATIENT
Start: 2024-07-15

## 2024-07-15 NOTE — TELEPHONE ENCOUNTER
Last OV 2/29/2024  Next OV 8/29/2024      Requested Prescriptions     Pending Prescriptions Disp Refills    losartan (COZAAR) 50 MG tablet 90 tablet 1     Sig: Take 1 tablet by mouth daily

## 2024-08-22 DIAGNOSIS — R73.9 HYPERGLYCEMIA: ICD-10-CM

## 2024-08-22 DIAGNOSIS — I10 PRIMARY HYPERTENSION: ICD-10-CM

## 2024-08-22 DIAGNOSIS — E55.9 VITAMIN D DEFICIENCY: ICD-10-CM

## 2024-08-22 LAB
25(OH)D3 SERPL-MCNC: 69.4 NG/ML
ALBUMIN SERPL-MCNC: 4 G/DL (ref 3.5–5.2)
ALP SERPL-CCNC: 108 U/L (ref 35–104)
ALT SERPL-CCNC: 59 U/L (ref 5–33)
ANION GAP SERPL CALCULATED.3IONS-SCNC: 12 MMOL/L (ref 7–19)
AST SERPL-CCNC: 68 U/L (ref 5–32)
BASOPHILS # BLD: 0 K/UL (ref 0–0.2)
BASOPHILS NFR BLD: 0.6 % (ref 0–1)
BILIRUB SERPL-MCNC: 0.5 MG/DL (ref 0.2–1.2)
BUN SERPL-MCNC: 14 MG/DL (ref 8–23)
CALCIUM SERPL-MCNC: 9.4 MG/DL (ref 8.8–10.2)
CHLORIDE SERPL-SCNC: 105 MMOL/L (ref 98–111)
CHOLEST SERPL-MCNC: 171 MG/DL (ref 160–199)
CO2 SERPL-SCNC: 28 MMOL/L (ref 22–29)
CREAT SERPL-MCNC: 0.6 MG/DL (ref 0.5–0.9)
EOSINOPHIL # BLD: 0.2 K/UL (ref 0–0.6)
EOSINOPHIL NFR BLD: 2.9 % (ref 0–5)
ERYTHROCYTE [DISTWIDTH] IN BLOOD BY AUTOMATED COUNT: 13.3 % (ref 11.5–14.5)
GLUCOSE SERPL-MCNC: 85 MG/DL (ref 74–109)
HBA1C MFR BLD: 5.6 % (ref 4–6)
HCT VFR BLD AUTO: 44.4 % (ref 37–47)
HDLC SERPL-MCNC: 49 MG/DL (ref 65–121)
HGB BLD-MCNC: 14.3 G/DL (ref 12–16)
IMM GRANULOCYTES # BLD: 0 K/UL
LDLC SERPL CALC-MCNC: 98 MG/DL
LYMPHOCYTES # BLD: 3 K/UL (ref 1.1–4.5)
LYMPHOCYTES NFR BLD: 42 % (ref 20–40)
MCH RBC QN AUTO: 30.3 PG (ref 27–31)
MCHC RBC AUTO-ENTMCNC: 32.2 G/DL (ref 33–37)
MCV RBC AUTO: 94.1 FL (ref 81–99)
MONOCYTES # BLD: 0.6 K/UL (ref 0–0.9)
MONOCYTES NFR BLD: 7.8 % (ref 0–10)
NEUTROPHILS # BLD: 3.4 K/UL (ref 1.5–7.5)
NEUTS SEG NFR BLD: 46.6 % (ref 50–65)
PLATELET # BLD AUTO: 207 K/UL (ref 130–400)
PMV BLD AUTO: 9.6 FL (ref 9.4–12.3)
POTASSIUM SERPL-SCNC: 5 MMOL/L (ref 3.5–5)
PROT SERPL-MCNC: 6.5 G/DL (ref 6.6–8.7)
RBC # BLD AUTO: 4.72 M/UL (ref 4.2–5.4)
SODIUM SERPL-SCNC: 145 MMOL/L (ref 136–145)
TRIGL SERPL-MCNC: 122 MG/DL (ref 0–149)
TSH SERPL DL<=0.005 MIU/L-ACNC: 1.51 UIU/ML (ref 0.27–4.2)
WBC # BLD AUTO: 7.2 K/UL (ref 4.8–10.8)

## 2024-08-29 ENCOUNTER — OFFICE VISIT (OUTPATIENT)
Dept: INTERNAL MEDICINE | Age: 74
End: 2024-08-29

## 2024-08-29 VITALS
OXYGEN SATURATION: 97 % | WEIGHT: 179 LBS | DIASTOLIC BLOOD PRESSURE: 80 MMHG | BODY MASS INDEX: 30.56 KG/M2 | HEIGHT: 64 IN | HEART RATE: 64 BPM | SYSTOLIC BLOOD PRESSURE: 136 MMHG

## 2024-08-29 DIAGNOSIS — Z91.09 ENVIRONMENTAL ALLERGIES: ICD-10-CM

## 2024-08-29 DIAGNOSIS — K21.9 GASTROESOPHAGEAL REFLUX DISEASE WITHOUT ESOPHAGITIS: ICD-10-CM

## 2024-08-29 DIAGNOSIS — E55.9 VITAMIN D DEFICIENCY: ICD-10-CM

## 2024-08-29 DIAGNOSIS — I10 PRIMARY HYPERTENSION: ICD-10-CM

## 2024-08-29 DIAGNOSIS — Z00.00 ROUTINE HEALTH MAINTENANCE: Primary | ICD-10-CM

## 2024-08-29 DIAGNOSIS — R10.11 RUQ ABDOMINAL PAIN: ICD-10-CM

## 2024-08-29 DIAGNOSIS — E66.9 OBESITY (BMI 30-39.9): ICD-10-CM

## 2024-08-29 DIAGNOSIS — Z12.31 ENCOUNTER FOR SCREENING MAMMOGRAM FOR MALIGNANT NEOPLASM OF BREAST: ICD-10-CM

## 2024-08-29 DIAGNOSIS — E78.5 HYPERLIPIDEMIA, UNSPECIFIED HYPERLIPIDEMIA TYPE: ICD-10-CM

## 2024-08-29 DIAGNOSIS — R79.89 ELEVATED LFTS: ICD-10-CM

## 2024-08-29 DIAGNOSIS — E53.8 B12 DEFICIENCY: ICD-10-CM

## 2024-08-29 SDOH — ECONOMIC STABILITY: FOOD INSECURITY: WITHIN THE PAST 12 MONTHS, YOU WORRIED THAT YOUR FOOD WOULD RUN OUT BEFORE YOU GOT MONEY TO BUY MORE.: NEVER TRUE

## 2024-08-29 SDOH — ECONOMIC STABILITY: FOOD INSECURITY: WITHIN THE PAST 12 MONTHS, THE FOOD YOU BOUGHT JUST DIDN'T LAST AND YOU DIDN'T HAVE MONEY TO GET MORE.: NEVER TRUE

## 2024-08-29 SDOH — ECONOMIC STABILITY: INCOME INSECURITY: HOW HARD IS IT FOR YOU TO PAY FOR THE VERY BASICS LIKE FOOD, HOUSING, MEDICAL CARE, AND HEATING?: NOT HARD AT ALL

## 2024-08-29 ASSESSMENT — LIFESTYLE VARIABLES
HOW MANY STANDARD DRINKS CONTAINING ALCOHOL DO YOU HAVE ON A TYPICAL DAY: PATIENT DOES NOT DRINK
HOW OFTEN DO YOU HAVE A DRINK CONTAINING ALCOHOL: NEVER

## 2024-08-29 ASSESSMENT — PATIENT HEALTH QUESTIONNAIRE - PHQ9
1. LITTLE INTEREST OR PLEASURE IN DOING THINGS: NOT AT ALL
SUM OF ALL RESPONSES TO PHQ9 QUESTIONS 1 & 2: 0
SUM OF ALL RESPONSES TO PHQ QUESTIONS 1-9: 0
2. FEELING DOWN, DEPRESSED OR HOPELESS: NOT AT ALL
SUM OF ALL RESPONSES TO PHQ QUESTIONS 1-9: 0

## 2024-08-29 NOTE — PROGRESS NOTES
Chief Complaint   Patient presents with    Medicare AWV     Pt wants to discuss lab work,        HPI: Annie Lomax is a 73 y.o. female is here for her annual Medicare wellness exam.  Her functional status is good.  She denies any history of falls.  She has no concerns in regards to safety, hearing, or cognition.      She is very concerned about her elevated liver enzymes that she saw on MyChart.  Her alkaline phosphatase is 108.  Her ALT is 59.  Her AST is 68.  Sometimes, she does have some right upper quadrant pain that goes to her back.  She also has some mild epigastric pain at times.  She still has her gallbladder.  We can certainly check a right upper quadrant ultrasound.  I will also repeat a CMP in about a month.  She really does not notice a pattern to her pain.    She does have a history of vitamin D deficiency and does take vitamin D supplementation.  She also has a history of vitamin D deficiency.    Her acid reflux is well-controlled on Nexium.  Her allergies are stable.  Her blood pressure is well-controlled.  She denies any complaints of chest pain, chest pressure or shortness of breath.    She is very concerned about her weight.  She wants to try something to help her lose weight.  She is willing to try Wegovy.  I did tell her that her insurance may not cover this medication.    Her allergies are stable on Nasacort.    She states that when she eats sugar, she gets flushed.  She feels like she may have some difficulty with hyperglycemia at times.  Her A1c was noted to be 5.6.  She did take some Tylenol couple of days ago for a headache.    Routine screening is as follows:  Eye exam yearly  Flu vaccine advised yearly  Pap: Declined today  Mammogram due and scheduled  Her colonoscopy is due.  She does plan to call Dr. Mcmahon to get this set up.  I did tell her if she continues to have epigastric pain, she needs to mention this to Dr. Mcmahon and we may have to set her up for a endoscopy.  KATHLEEN September  Environmental allergies: Continue Flonase as prescribed.  Continue Nasacort    6.  Hypertension: Blood pressure well-controlled on hydrochlorothiazide and Cozaar.  Continue metoprolol as prescribed    7.  Obesity: Try Wegovy    8.  Elevated liver enzymes right upper quadrant abdominal pain: Right upper quadrant ultrasound ordered.  Check a CMP in a month    9.  Hyperlipidemia: Check lipid panel with next lab draw    Annie was seen today for medicare awv.    Diagnoses and all orders for this visit:    Routine health maintenance    Encounter for screening mammogram for malignant neoplasm of breast  -     DOROTHY DIGITAL SCREEN W OR WO CAD BILATERAL; Future    Vitamin D deficiency  -     Vitamin D 25 Hydroxy; Future    Elevated LFTs  -     Comprehensive Metabolic Panel; Future  -     CBC with Auto Differential; Future  -     US GALLBLADDER RUQ; Future  -     Comprehensive Metabolic Panel; Future  -     CBC with Auto Differential; Future    Hyperlipidemia, unspecified hyperlipidemia type  -     Vitamin D 25 Hydroxy; Future  -     TSH; Future  -     Lipid Panel; Future  -     Hemoglobin A1C; Future    B12 deficiency    Gastroesophageal reflux disease without esophagitis    Environmental allergies    Primary hypertension    Obesity (BMI 30-39.9)    RUQ abdominal pain    Other orders  -     Semaglutide-Weight Management (WEGOVY) 0.25 MG/0.5ML SOAJ SC injection; Inject 0.25 mg into the skin every 7 days          Return in about 1 month (around 9/29/2024), or weight check.     Orders Placed This Encounter   Procedures    DOROTHY DIGITAL SCREEN W OR WO CAD BILATERAL     Standing Status:   Future     Standing Expiration Date:   10/29/2025     Scheduling Instructions:      Buddhist     Order Specific Question:   Does this patient have implants?     Answer:   No     Order Specific Question:   Does this patient have a personal history of breast cancer?     Answer:   No     Order Specific Question:   Where was last mammogram performed?

## 2024-08-31 ASSESSMENT — ENCOUNTER SYMPTOMS
TROUBLE SWALLOWING: 0
VOMITING: 0
FACIAL SWELLING: 0
COLOR CHANGE: 0
BACK PAIN: 0
EYE DISCHARGE: 0
SORE THROAT: 0
CHEST TIGHTNESS: 0
PHOTOPHOBIA: 0
EYE ITCHING: 0
CONSTIPATION: 0
RECTAL PAIN: 0
EYE PAIN: 0
ANAL BLEEDING: 0
SINUS PAIN: 0
SHORTNESS OF BREATH: 0
DIARRHEA: 0
RHINORRHEA: 0
WHEEZING: 0
CHOKING: 0
VOICE CHANGE: 0
ABDOMINAL DISTENTION: 0
ABDOMINAL PAIN: 1
SINUS PRESSURE: 0
BLOOD IN STOOL: 0
COUGH: 0
EYE REDNESS: 0
NAUSEA: 0

## 2024-09-04 ENCOUNTER — HOSPITAL ENCOUNTER (OUTPATIENT)
Dept: ULTRASOUND IMAGING | Age: 74
Discharge: HOME | End: 2024-09-04
Attending: INTERNAL MEDICINE
Payer: MEDICARE

## 2024-09-04 DIAGNOSIS — R79.89 ELEVATED LFTS: ICD-10-CM

## 2024-09-04 PROCEDURE — 76705 ECHO EXAM OF ABDOMEN: CPT

## 2024-09-05 ENCOUNTER — TRANSCRIBE ORDERS (OUTPATIENT)
Dept: ADMINISTRATIVE | Facility: HOSPITAL | Age: 74
End: 2024-09-05
Payer: MEDICARE

## 2024-09-05 DIAGNOSIS — Z12.31 SCREENING MAMMOGRAM FOR BREAST CANCER: Primary | ICD-10-CM

## 2024-09-11 ENCOUNTER — HOSPITAL ENCOUNTER (OUTPATIENT)
Dept: MAMMOGRAPHY | Facility: HOSPITAL | Age: 74
Discharge: HOME | End: 2024-09-11
Admitting: INTERNAL MEDICINE
Payer: MEDICARE

## 2024-09-11 DIAGNOSIS — R79.89 ELEVATED LFTS: ICD-10-CM

## 2024-09-11 LAB
ALBUMIN SERPL-MCNC: 4.1 G/DL (ref 3.5–5.2)
ALP SERPL-CCNC: 111 U/L (ref 35–104)
ALT SERPL-CCNC: 68 U/L (ref 5–33)
ANION GAP SERPL CALCULATED.3IONS-SCNC: 7 MMOL/L (ref 7–19)
AST SERPL-CCNC: 70 U/L (ref 5–32)
BASOPHILS # BLD: 0.1 K/UL (ref 0–0.2)
BASOPHILS NFR BLD: 0.8 % (ref 0–1)
BILIRUB SERPL-MCNC: 0.6 MG/DL (ref 0.2–1.2)
BUN SERPL-MCNC: 16 MG/DL (ref 8–23)
CALCIUM SERPL-MCNC: 9.4 MG/DL (ref 8.8–10.2)
CHLORIDE SERPL-SCNC: 103 MMOL/L (ref 98–111)
CO2 SERPL-SCNC: 30 MMOL/L (ref 22–29)
CREAT SERPL-MCNC: 0.6 MG/DL (ref 0.5–0.9)
EOSINOPHIL # BLD: 0.2 K/UL (ref 0–0.6)
EOSINOPHIL NFR BLD: 2.9 % (ref 0–5)
ERYTHROCYTE [DISTWIDTH] IN BLOOD BY AUTOMATED COUNT: 13.5 % (ref 11.5–14.5)
GLUCOSE SERPL-MCNC: 87 MG/DL (ref 70–99)
HCT VFR BLD AUTO: 43.7 % (ref 37–47)
HGB BLD-MCNC: 14.1 G/DL (ref 12–16)
IMM GRANULOCYTES # BLD: 0 K/UL
LYMPHOCYTES # BLD: 3 K/UL (ref 1.1–4.5)
LYMPHOCYTES NFR BLD: 46 % (ref 20–40)
MCH RBC QN AUTO: 30.3 PG (ref 27–31)
MCHC RBC AUTO-ENTMCNC: 32.3 G/DL (ref 33–37)
MCV RBC AUTO: 94 FL (ref 81–99)
MONOCYTES # BLD: 0.5 K/UL (ref 0–0.9)
MONOCYTES NFR BLD: 7.4 % (ref 0–10)
NEUTROPHILS # BLD: 2.8 K/UL (ref 1.5–7.5)
NEUTS SEG NFR BLD: 42.9 % (ref 50–65)
PLATELET # BLD AUTO: 215 K/UL (ref 130–400)
PMV BLD AUTO: 9.7 FL (ref 9.4–12.3)
POTASSIUM SERPL-SCNC: 4.5 MMOL/L (ref 3.5–5)
PROT SERPL-MCNC: 6.7 G/DL (ref 6.4–8.3)
RBC # BLD AUTO: 4.65 M/UL (ref 4.2–5.4)
SODIUM SERPL-SCNC: 140 MMOL/L (ref 136–145)
WBC # BLD AUTO: 6.5 K/UL (ref 4.8–10.8)

## 2024-09-11 PROCEDURE — 77063 BREAST TOMOSYNTHESIS BI: CPT

## 2024-09-11 PROCEDURE — 77067 SCR MAMMO BI INCL CAD: CPT

## 2024-09-18 ENCOUNTER — OFFICE VISIT (OUTPATIENT)
Dept: INTERNAL MEDICINE | Age: 74
End: 2024-09-18

## 2024-09-18 VITALS
HEIGHT: 64 IN | WEIGHT: 175.6 LBS | DIASTOLIC BLOOD PRESSURE: 76 MMHG | BODY MASS INDEX: 29.98 KG/M2 | HEART RATE: 70 BPM | SYSTOLIC BLOOD PRESSURE: 133 MMHG | OXYGEN SATURATION: 97 %

## 2024-09-18 DIAGNOSIS — R93.429 ABNORMAL FINDING ON DIAGNOSTIC IMAGING OF KIDNEY: ICD-10-CM

## 2024-09-18 DIAGNOSIS — E66.3 OVERWEIGHT: Primary | ICD-10-CM

## 2024-09-18 DIAGNOSIS — I10 PRIMARY HYPERTENSION: ICD-10-CM

## 2024-09-18 DIAGNOSIS — R79.89 ELEVATED LFTS: Primary | ICD-10-CM

## 2024-09-18 DIAGNOSIS — E53.8 B12 DEFICIENCY: ICD-10-CM

## 2024-09-18 DIAGNOSIS — K21.9 GASTROESOPHAGEAL REFLUX DISEASE WITHOUT ESOPHAGITIS: ICD-10-CM

## 2024-09-18 DIAGNOSIS — R93.3 ABNORMAL DIGESTIVE SYSTEM DIAGNOSTIC IMAGING: ICD-10-CM

## 2024-09-18 DIAGNOSIS — Z91.09 ENVIRONMENTAL ALLERGIES: ICD-10-CM

## 2024-09-18 DIAGNOSIS — E55.9 VITAMIN D DEFICIENCY: ICD-10-CM

## 2024-09-18 DIAGNOSIS — R74.8 ELEVATED LIVER ENZYMES: ICD-10-CM

## 2024-09-18 DIAGNOSIS — R93.89 ABNORMAL ULTRASOUND: ICD-10-CM

## 2024-09-18 RX ORDER — PHENTERMINE HYDROCHLORIDE 15 MG/1
15 CAPSULE ORAL EVERY MORNING
Qty: 30 CAPSULE | Refills: 0 | Status: SHIPPED | OUTPATIENT
Start: 2024-09-18 | End: 2024-10-18

## 2024-09-19 SDOH — ECONOMIC STABILITY: FOOD INSECURITY: WITHIN THE PAST 12 MONTHS, YOU WORRIED THAT YOUR FOOD WOULD RUN OUT BEFORE YOU GOT MONEY TO BUY MORE.: NEVER TRUE

## 2024-09-19 SDOH — ECONOMIC STABILITY: FOOD INSECURITY: WITHIN THE PAST 12 MONTHS, THE FOOD YOU BOUGHT JUST DIDN'T LAST AND YOU DIDN'T HAVE MONEY TO GET MORE.: NEVER TRUE

## 2024-09-19 SDOH — ECONOMIC STABILITY: INCOME INSECURITY: HOW HARD IS IT FOR YOU TO PAY FOR THE VERY BASICS LIKE FOOD, HOUSING, MEDICAL CARE, AND HEATING?: NOT HARD AT ALL

## 2024-09-19 ASSESSMENT — ENCOUNTER SYMPTOMS
RHINORRHEA: 0
VOMITING: 0
EYE DISCHARGE: 0
CHOKING: 0
SINUS PAIN: 0
BLOOD IN STOOL: 0
CONSTIPATION: 0
EYE PAIN: 0
SHORTNESS OF BREATH: 0
DIARRHEA: 0
RECTAL PAIN: 0
CHEST TIGHTNESS: 0
NAUSEA: 0
ABDOMINAL PAIN: 1
EYE ITCHING: 0
COUGH: 0
BACK PAIN: 0
COLOR CHANGE: 0
ABDOMINAL DISTENTION: 0
EYE REDNESS: 0
WHEEZING: 0
PHOTOPHOBIA: 0
SORE THROAT: 0
VOICE CHANGE: 0
TROUBLE SWALLOWING: 0
ANAL BLEEDING: 0
FACIAL SWELLING: 0
SINUS PRESSURE: 0

## 2024-10-10 ENCOUNTER — HOSPITAL ENCOUNTER (OUTPATIENT)
Dept: CT IMAGING | Age: 74
Discharge: HOME | End: 2024-10-10
Attending: INTERNAL MEDICINE
Payer: MEDICARE

## 2024-10-10 DIAGNOSIS — R79.89 ELEVATED LFTS: ICD-10-CM

## 2024-10-10 DIAGNOSIS — R93.89 ABNORMAL ULTRASOUND: ICD-10-CM

## 2024-10-10 PROCEDURE — 6360000004 HC RX CONTRAST MEDICATION: Performed by: INTERNAL MEDICINE

## 2024-10-10 PROCEDURE — 74178 CT ABD&PLV WO CNTR FLWD CNTR: CPT

## 2024-10-10 RX ORDER — IOPAMIDOL 755 MG/ML
75 INJECTION, SOLUTION INTRAVASCULAR
Status: COMPLETED | OUTPATIENT
Start: 2024-10-10 | End: 2024-10-10

## 2024-10-10 RX ADMIN — IOPAMIDOL 75 ML: 755 INJECTION, SOLUTION INTRAVENOUS at 13:22

## 2024-10-22 ENCOUNTER — OFFICE VISIT (OUTPATIENT)
Dept: INTERNAL MEDICINE | Age: 74
End: 2024-10-22

## 2024-10-22 VITALS
SYSTOLIC BLOOD PRESSURE: 123 MMHG | OXYGEN SATURATION: 98 % | WEIGHT: 164.4 LBS | HEIGHT: 64 IN | HEART RATE: 74 BPM | BODY MASS INDEX: 28.07 KG/M2 | DIASTOLIC BLOOD PRESSURE: 67 MMHG

## 2024-10-22 DIAGNOSIS — I10 PRIMARY HYPERTENSION: ICD-10-CM

## 2024-10-22 DIAGNOSIS — E55.9 VITAMIN D DEFICIENCY: ICD-10-CM

## 2024-10-22 DIAGNOSIS — Z91.09 ENVIRONMENTAL ALLERGIES: ICD-10-CM

## 2024-10-22 DIAGNOSIS — K21.9 GASTROESOPHAGEAL REFLUX DISEASE WITHOUT ESOPHAGITIS: ICD-10-CM

## 2024-10-22 DIAGNOSIS — R68.2 DRY MOUTH: ICD-10-CM

## 2024-10-22 DIAGNOSIS — F41.9 ANXIETY DISORDER, UNSPECIFIED TYPE: ICD-10-CM

## 2024-10-22 DIAGNOSIS — E53.8 B12 DEFICIENCY: ICD-10-CM

## 2024-10-22 DIAGNOSIS — E66.3 OVERWEIGHT: Primary | ICD-10-CM

## 2024-10-22 LAB
ALCOHOL URINE: NORMAL
AMPHETAMINE SCREEN URINE: NORMAL
BARBITURATE SCREEN URINE: NORMAL
BENZODIAZEPINE SCREEN, URINE: NORMAL
BUPRENORPHINE URINE: NORMAL
COCAINE METABOLITE SCREEN URINE: NORMAL
FENTANYL SCREEN, URINE: NORMAL
GABAPENTIN SCREEN, URINE: NORMAL
MDMA, URINE: NORMAL
METHADONE SCREEN, URINE: NORMAL
METHAMPHETAMINE, URINE: NORMAL
OPIATE SCREEN URINE: NORMAL
OXYCODONE SCREEN URINE: NORMAL
PHENCYCLIDINE SCREEN URINE: NORMAL
PROPOXYPHENE SCREEN, URINE: NORMAL
SYNTHETIC CANNABINOIDS(K2) SCREEN, URINE: NORMAL
THC SCREEN, URINE: NORMAL
TRAMADOL SCREEN URINE: NORMAL
TRICYCLIC ANTIDEPRESSANTS, UR: NORMAL

## 2024-10-22 RX ORDER — PHENTERMINE HYDROCHLORIDE 15 MG/1
15 CAPSULE ORAL EVERY MORNING
Qty: 30 CAPSULE | Refills: 0 | Status: SHIPPED | OUTPATIENT
Start: 2024-10-22 | End: 2024-11-21

## 2024-10-22 RX ORDER — BUSPIRONE HYDROCHLORIDE 5 MG/1
5 TABLET ORAL 3 TIMES DAILY
Qty: 30 TABLET | Refills: 0 | Status: SHIPPED | OUTPATIENT
Start: 2024-10-22 | End: 2024-11-01

## 2024-10-22 NOTE — PROGRESS NOTES
toxic-appearing or diaphoretic.   HENT:      Head: Normocephalic and atraumatic.      Right Ear: Tympanic membrane, ear canal and external ear normal. There is no impacted cerumen.      Left Ear: Ear canal and external ear normal. There is no impacted cerumen.      Nose: Nose normal. No congestion or rhinorrhea.      Mouth/Throat:      Mouth: Mucous membranes are moist.      Pharynx: Oropharynx is clear. No oropharyngeal exudate or posterior oropharyngeal erythema.   Eyes:      General: No scleral icterus.        Right eye: No discharge.         Left eye: No discharge.      Extraocular Movements: Extraocular movements intact.      Conjunctiva/sclera: Conjunctivae normal.      Pupils: Pupils are equal, round, and reactive to light.   Neck:      Thyroid: No thyromegaly.      Vascular: No carotid bruit or JVD.      Trachea: No tracheal deviation.   Cardiovascular:      Rate and Rhythm: Normal rate and regular rhythm.      Pulses: Normal pulses.      Heart sounds: Normal heart sounds. No murmur heard.     No friction rub. No gallop.   Pulmonary:      Effort: Pulmonary effort is normal. No respiratory distress.      Breath sounds: Normal breath sounds. No stridor. No wheezing, rhonchi or rales.   Chest:      Chest wall: No tenderness.   Abdominal:      General: Bowel sounds are normal. There is no distension.      Palpations: Abdomen is soft. There is no mass.      Tenderness: There is abdominal tenderness. There is no right CVA tenderness, left CVA tenderness, guarding or rebound.      Hernia: No hernia is present.      Comments: She does have tenderness over the right upper quadrant area and epigastric area on palpation.   Musculoskeletal:         General: No swelling, tenderness, deformity or signs of injury. Normal range of motion.      Cervical back: Normal range of motion and neck supple. No rigidity. No muscular tenderness.      Right lower leg: No edema.      Left lower leg: No edema.   Lymphadenopathy:

## 2024-10-29 DIAGNOSIS — R93.429 ABNORMAL FINDING ON DIAGNOSTIC IMAGING OF KIDNEY: Primary | ICD-10-CM

## 2024-11-03 SDOH — ECONOMIC STABILITY: FOOD INSECURITY: WITHIN THE PAST 12 MONTHS, YOU WORRIED THAT YOUR FOOD WOULD RUN OUT BEFORE YOU GOT MONEY TO BUY MORE.: NEVER TRUE

## 2024-11-03 SDOH — ECONOMIC STABILITY: FOOD INSECURITY: WITHIN THE PAST 12 MONTHS, THE FOOD YOU BOUGHT JUST DIDN'T LAST AND YOU DIDN'T HAVE MONEY TO GET MORE.: NEVER TRUE

## 2024-11-03 SDOH — ECONOMIC STABILITY: INCOME INSECURITY: HOW HARD IS IT FOR YOU TO PAY FOR THE VERY BASICS LIKE FOOD, HOUSING, MEDICAL CARE, AND HEATING?: NOT HARD AT ALL

## 2024-11-03 ASSESSMENT — PATIENT HEALTH QUESTIONNAIRE - PHQ9
SUM OF ALL RESPONSES TO PHQ QUESTIONS 1-9: 0
SUM OF ALL RESPONSES TO PHQ QUESTIONS 1-9: 0
SUM OF ALL RESPONSES TO PHQ9 QUESTIONS 1 & 2: 0
1. LITTLE INTEREST OR PLEASURE IN DOING THINGS: NOT AT ALL
SUM OF ALL RESPONSES TO PHQ QUESTIONS 1-9: 0
SUM OF ALL RESPONSES TO PHQ QUESTIONS 1-9: 0
2. FEELING DOWN, DEPRESSED OR HOPELESS: NOT AT ALL

## 2024-11-15 DIAGNOSIS — K21.9 GASTROESOPHAGEAL REFLUX DISEASE WITHOUT ESOPHAGITIS: ICD-10-CM

## 2024-11-22 ENCOUNTER — TELEPHONE (OUTPATIENT)
Dept: INTERNAL MEDICINE | Age: 74
End: 2024-11-22

## 2024-11-22 NOTE — TELEPHONE ENCOUNTER
Patient calling 11/22/24 requesting a refill of their esomeprazole (NEXIUM) 20 MG delayed release capsule  medication. Patient would like it sent to  [facility] [place] - [address] [place] [address] - P [phone] - F [phone] .

## 2024-12-10 ENCOUNTER — TELEPHONE (OUTPATIENT)
Dept: INTERNAL MEDICINE | Age: 74
End: 2024-12-10

## 2024-12-10 DIAGNOSIS — I10 PRIMARY HYPERTENSION: ICD-10-CM

## 2024-12-10 RX ORDER — METOPROLOL SUCCINATE 100 MG/1
50 TABLET, EXTENDED RELEASE ORAL 2 TIMES DAILY
Qty: 90 TABLET | Refills: 1 | Status: SHIPPED | OUTPATIENT
Start: 2024-12-10

## 2024-12-10 RX ORDER — LOSARTAN POTASSIUM 50 MG/1
50 TABLET ORAL DAILY
Qty: 90 TABLET | Refills: 1 | Status: SHIPPED | OUTPATIENT
Start: 2024-12-10

## 2024-12-10 RX ORDER — PHENTERMINE HYDROCHLORIDE 37.5 MG/1
37.5 TABLET ORAL
Qty: 30 TABLET | Refills: 0 | Status: SHIPPED | OUTPATIENT
Start: 2024-12-10 | End: 2025-01-09

## 2024-12-10 NOTE — TELEPHONE ENCOUNTER
Patient calling 12/10/24 requesting a refill of their losartan (COZAAR) 50 MG tablet and  metoprolol succinate (TOPROL XL) 100 MG extended release tablet medication. Patient would like it sent to [facility] [address] [facility] -  [phone] - F [phone]  .         Patient calling 12/10/24 requesting a refill of their   phentermine 15 MG capsule [  medication. Patient would like it sent to [address] -  [phone] - f [phone] [[id number]] .     Patient was wanting to know if they could go up in dosage on the phentermine

## 2024-12-10 NOTE — TELEPHONE ENCOUNTER
Annie C Toon called to request a refill on her medication.      Last office visit : 10/22/2024   Next office visit : 12/30/2024     Last UDS:   Benzodiazepine Screen, Urine   Date Value Ref Range Status   10/22/2024 neg  Final     Buprenorphine Urine   Date Value Ref Range Status   10/22/2024 neg  Final     Cocaine Metabolite Screen, Urine   Date Value Ref Range Status   10/22/2024 neg  Final     Gabapentin Screen, Urine   Date Value Ref Range Status   10/22/2024 na  Final     Oxycodone Screen, Ur   Date Value Ref Range Status   10/22/2024 neg  Final     Propoxyphene Screen, Urine   Date Value Ref Range Status   10/22/2024 neg  Final     THC Screen, Urine   Date Value Ref Range Status   10/22/2024 neg  Final     Tricyclic Antidepressants, Urine   Date Value Ref Range Status   10/22/2024 neg  Final       Last Placido: 11/3/2024  Medication Contract: 10/22/2024     Requested Prescriptions     Pending Prescriptions Disp Refills    phentermine (ADIPEX-P) 37.5 MG tablet 30 tablet 0     Sig: Take 1 tablet by mouth every morning (before breakfast) for 30 days. Max Daily Amount: 37.5 mg     Signed Prescriptions Disp Refills    losartan (COZAAR) 50 MG tablet 90 tablet 1     Sig: Take 1 tablet by mouth daily     Authorizing Provider: YAMILETH KAY     Ordering User: AILYN FLANNERY    metoprolol succinate (TOPROL XL) 100 MG extended release tablet 90 tablet 1     Sig: Take 0.5 tablets by mouth 2 times daily     Authorizing Provider: YAMILETH KAY     Ordering User: AILYN FLANNERY         Please approve or refuse this medication.   Ailyn Flannery LPN

## 2024-12-30 ENCOUNTER — OFFICE VISIT (OUTPATIENT)
Dept: INTERNAL MEDICINE | Age: 74
End: 2024-12-30
Payer: MEDICARE

## 2024-12-30 VITALS
DIASTOLIC BLOOD PRESSURE: 76 MMHG | HEART RATE: 75 BPM | BODY MASS INDEX: 26.8 KG/M2 | SYSTOLIC BLOOD PRESSURE: 124 MMHG | WEIGHT: 157 LBS | OXYGEN SATURATION: 97 % | HEIGHT: 64 IN

## 2024-12-30 DIAGNOSIS — K21.9 GASTROESOPHAGEAL REFLUX DISEASE WITHOUT ESOPHAGITIS: ICD-10-CM

## 2024-12-30 DIAGNOSIS — I10 PRIMARY HYPERTENSION: ICD-10-CM

## 2024-12-30 DIAGNOSIS — E01.0 THYROMEGALY: ICD-10-CM

## 2024-12-30 DIAGNOSIS — E66.3 OVERWEIGHT (BMI 25.0-29.9): Primary | ICD-10-CM

## 2024-12-30 DIAGNOSIS — R22.1 LUMP IN NECK: ICD-10-CM

## 2024-12-30 DIAGNOSIS — E55.9 VITAMIN D DEFICIENCY: ICD-10-CM

## 2024-12-30 DIAGNOSIS — Z91.09 ENVIRONMENTAL ALLERGIES: ICD-10-CM

## 2024-12-30 PROCEDURE — 3078F DIAST BP <80 MM HG: CPT | Performed by: INTERNAL MEDICINE

## 2024-12-30 PROCEDURE — G8427 DOCREV CUR MEDS BY ELIG CLIN: HCPCS | Performed by: INTERNAL MEDICINE

## 2024-12-30 PROCEDURE — 3017F COLORECTAL CA SCREEN DOC REV: CPT | Performed by: INTERNAL MEDICINE

## 2024-12-30 PROCEDURE — G8484 FLU IMMUNIZE NO ADMIN: HCPCS | Performed by: INTERNAL MEDICINE

## 2024-12-30 PROCEDURE — 1159F MED LIST DOCD IN RCRD: CPT | Performed by: INTERNAL MEDICINE

## 2024-12-30 PROCEDURE — G8417 CALC BMI ABV UP PARAM F/U: HCPCS | Performed by: INTERNAL MEDICINE

## 2024-12-30 PROCEDURE — 1123F ACP DISCUSS/DSCN MKR DOCD: CPT | Performed by: INTERNAL MEDICINE

## 2024-12-30 PROCEDURE — 99214 OFFICE O/P EST MOD 30 MIN: CPT | Performed by: INTERNAL MEDICINE

## 2024-12-30 PROCEDURE — G8399 PT W/DXA RESULTS DOCUMENT: HCPCS | Performed by: INTERNAL MEDICINE

## 2024-12-30 PROCEDURE — 1036F TOBACCO NON-USER: CPT | Performed by: INTERNAL MEDICINE

## 2024-12-30 PROCEDURE — 1090F PRES/ABSN URINE INCON ASSESS: CPT | Performed by: INTERNAL MEDICINE

## 2024-12-30 PROCEDURE — 3074F SYST BP LT 130 MM HG: CPT | Performed by: INTERNAL MEDICINE

## 2024-12-31 SDOH — ECONOMIC STABILITY: FOOD INSECURITY: WITHIN THE PAST 12 MONTHS, THE FOOD YOU BOUGHT JUST DIDN'T LAST AND YOU DIDN'T HAVE MONEY TO GET MORE.: NEVER TRUE

## 2024-12-31 SDOH — ECONOMIC STABILITY: FOOD INSECURITY: WITHIN THE PAST 12 MONTHS, YOU WORRIED THAT YOUR FOOD WOULD RUN OUT BEFORE YOU GOT MONEY TO BUY MORE.: NEVER TRUE

## 2024-12-31 SDOH — ECONOMIC STABILITY: INCOME INSECURITY: HOW HARD IS IT FOR YOU TO PAY FOR THE VERY BASICS LIKE FOOD, HOUSING, MEDICAL CARE, AND HEATING?: NOT HARD AT ALL

## 2024-12-31 ASSESSMENT — ENCOUNTER SYMPTOMS
BLOOD IN STOOL: 0
RECTAL PAIN: 0
SHORTNESS OF BREATH: 0
BACK PAIN: 0
COUGH: 0
SINUS PRESSURE: 0
ABDOMINAL DISTENTION: 0
VOMITING: 0
SINUS PAIN: 0
FACIAL SWELLING: 0
RHINORRHEA: 0
EYE DISCHARGE: 0
EYE PAIN: 0
CHOKING: 0
COLOR CHANGE: 0
EYE REDNESS: 0
NAUSEA: 0
PHOTOPHOBIA: 0
TROUBLE SWALLOWING: 0
ABDOMINAL PAIN: 0
VOICE CHANGE: 0
CHEST TIGHTNESS: 0
EYE ITCHING: 0
DIARRHEA: 0
WHEEZING: 0
CONSTIPATION: 0
ANAL BLEEDING: 0
SORE THROAT: 0

## 2024-12-31 ASSESSMENT — PATIENT HEALTH QUESTIONNAIRE - PHQ9
SUM OF ALL RESPONSES TO PHQ9 QUESTIONS 1 & 2: 0
SUM OF ALL RESPONSES TO PHQ QUESTIONS 1-9: 0
1. LITTLE INTEREST OR PLEASURE IN DOING THINGS: NOT AT ALL
SUM OF ALL RESPONSES TO PHQ QUESTIONS 1-9: 0
SUM OF ALL RESPONSES TO PHQ QUESTIONS 1-9: 0
2. FEELING DOWN, DEPRESSED OR HOPELESS: NOT AT ALL
SUM OF ALL RESPONSES TO PHQ QUESTIONS 1-9: 0

## 2025-01-03 ENCOUNTER — HOSPITAL ENCOUNTER (OUTPATIENT)
Dept: ULTRASOUND IMAGING | Age: 75
Discharge: HOME | End: 2025-01-03
Payer: MEDICARE

## 2025-01-03 DIAGNOSIS — R22.1 LUMP IN NECK: ICD-10-CM

## 2025-01-03 PROCEDURE — 76536 US EXAM OF HEAD AND NECK: CPT

## 2025-01-28 DIAGNOSIS — R22.1 LUMP IN NECK: Primary | ICD-10-CM

## 2025-01-28 DIAGNOSIS — E01.0 THYROMEGALY: ICD-10-CM

## 2025-01-28 DIAGNOSIS — E04.2 MULTIPLE THYROID NODULES: ICD-10-CM

## 2025-01-31 DIAGNOSIS — E01.0 THYROMEGALY: ICD-10-CM

## 2025-01-31 DIAGNOSIS — E04.2 MULTIPLE THYROID NODULES: ICD-10-CM

## 2025-01-31 DIAGNOSIS — R22.1 LUMP IN NECK: Primary | ICD-10-CM

## 2025-02-06 ENCOUNTER — OFFICE VISIT (OUTPATIENT)
Dept: ENT CLINIC | Age: 75
End: 2025-02-06
Payer: MEDICARE

## 2025-02-06 VITALS
WEIGHT: 152 LBS | DIASTOLIC BLOOD PRESSURE: 78 MMHG | SYSTOLIC BLOOD PRESSURE: 118 MMHG | HEIGHT: 64 IN | BODY MASS INDEX: 25.95 KG/M2

## 2025-02-06 DIAGNOSIS — E04.2 NONTOXIC MULTINODULAR GOITER: Primary | ICD-10-CM

## 2025-02-06 DIAGNOSIS — R09.A2 GLOBUS SENSATION: ICD-10-CM

## 2025-02-06 DIAGNOSIS — R09.82 POSTNASAL DRIP: ICD-10-CM

## 2025-02-06 PROCEDURE — 3017F COLORECTAL CA SCREEN DOC REV: CPT | Performed by: NURSE PRACTITIONER

## 2025-02-06 PROCEDURE — G8399 PT W/DXA RESULTS DOCUMENT: HCPCS | Performed by: NURSE PRACTITIONER

## 2025-02-06 PROCEDURE — G8417 CALC BMI ABV UP PARAM F/U: HCPCS | Performed by: NURSE PRACTITIONER

## 2025-02-06 PROCEDURE — 1123F ACP DISCUSS/DSCN MKR DOCD: CPT | Performed by: NURSE PRACTITIONER

## 2025-02-06 PROCEDURE — 1159F MED LIST DOCD IN RCRD: CPT | Performed by: NURSE PRACTITIONER

## 2025-02-06 PROCEDURE — 1160F RVW MEDS BY RX/DR IN RCRD: CPT | Performed by: NURSE PRACTITIONER

## 2025-02-06 PROCEDURE — G8427 DOCREV CUR MEDS BY ELIG CLIN: HCPCS | Performed by: NURSE PRACTITIONER

## 2025-02-06 PROCEDURE — 1036F TOBACCO NON-USER: CPT | Performed by: NURSE PRACTITIONER

## 2025-02-06 PROCEDURE — 99203 OFFICE O/P NEW LOW 30 MIN: CPT | Performed by: NURSE PRACTITIONER

## 2025-02-06 PROCEDURE — 1090F PRES/ABSN URINE INCON ASSESS: CPT | Performed by: NURSE PRACTITIONER

## 2025-02-06 RX ORDER — IPRATROPIUM BROMIDE 21 UG/1
2 SPRAY, METERED NASAL EVERY 12 HOURS
Qty: 30 ML | Refills: 3 | Status: SHIPPED | OUTPATIENT
Start: 2025-02-06

## 2025-02-06 ASSESSMENT — ENCOUNTER SYMPTOMS
ALLERGIC/IMMUNOLOGIC NEGATIVE: 1
EYES NEGATIVE: 1
TROUBLE SWALLOWING: 1
COUGH: 1

## 2025-02-06 NOTE — PROGRESS NOTES
2025    Annie Lomax (:  1950) is a 74 y.o. female, Established patient, here for evaluation of the following chief complaint(s):  New Patient (THYROID )      Vitals:    25 1343   BP: 118/78   Weight: 68.9 kg (152 lb)   Height: 1.626 m (5' 4\")       Wt Readings from Last 3 Encounters:   25 68.9 kg (152 lb)   24 71.2 kg (157 lb)   10/22/24 74.6 kg (164 lb 6.4 oz)       BP Readings from Last 3 Encounters:   25 118/78   24 124/76   10/22/24 123/67         SUBJECTIVE/OBJECTIVE:    Patient seen today for her thyroid. She had a thyroid ultrasound done on 25 that showed multiple nodules. She reports she had this done because for several years she feels like her throat is closing when she lays down. She reports that since [facility] she has had globus sensation, cough, and post nasal drip. She reports that she did get sick at [facility] and this is when her symptoms started. She does report that she rarely has heartburn and takes Nexium. She reports she see's Dr. Mcmahon in [place] and this is her GI doctor. She denies difficulty breathing. She denies difficulty swallowing but feels like her throat gets tight around food when she swallows. She does not currently take allergy medication.        Review of Systems   Constitutional: Negative.    HENT:  Positive for postnasal drip and trouble swallowing (feels like throat gets tight around food when swallowing).         Globus sensation   Eyes: Negative.    Respiratory:  Positive for cough.    Cardiovascular: Negative.    Gastrointestinal:         Heartburn, rare   Endocrine: Negative.    Musculoskeletal: Negative.    Skin: Negative.    Allergic/Immunologic: Negative.    Neurological: Negative.    Hematological: Negative.    Psychiatric/Behavioral: Negative.          Physical Exam  Vitals reviewed.   Constitutional:       Appearance: Normal appearance. She is normal weight.   HENT:      Head: Normocephalic and atraumatic.

## 2025-02-21 DIAGNOSIS — E78.5 HYPERLIPIDEMIA, UNSPECIFIED HYPERLIPIDEMIA TYPE: ICD-10-CM

## 2025-02-21 DIAGNOSIS — R68.2 DRY MOUTH: ICD-10-CM

## 2025-02-21 DIAGNOSIS — E55.9 VITAMIN D DEFICIENCY: ICD-10-CM

## 2025-02-21 DIAGNOSIS — R79.89 ELEVATED LFTS: ICD-10-CM

## 2025-02-21 LAB
25(OH)D3 SERPL-MCNC: 88.2 NG/ML
ALBUMIN SERPL-MCNC: 4.1 G/DL (ref 3.5–5.2)
ALP SERPL-CCNC: 92 U/L (ref 35–104)
ALT SERPL-CCNC: 24 U/L (ref 5–33)
ANION GAP SERPL CALCULATED.3IONS-SCNC: 9 MMOL/L (ref 8–16)
AST SERPL-CCNC: 29 U/L (ref 5–32)
BASOPHILS # BLD: 0 K/UL (ref 0–0.2)
BASOPHILS NFR BLD: 0.6 % (ref 0–1)
BILIRUB SERPL-MCNC: 0.4 MG/DL (ref 0.2–1.2)
BUN SERPL-MCNC: 11 MG/DL (ref 8–23)
CALCIUM SERPL-MCNC: 9.7 MG/DL (ref 8.8–10.2)
CHLORIDE SERPL-SCNC: 103 MMOL/L (ref 98–107)
CHOLEST SERPL-MCNC: 177 MG/DL (ref 0–199)
CO2 SERPL-SCNC: 31 MMOL/L (ref 22–29)
CREAT SERPL-MCNC: 0.6 MG/DL (ref 0.5–0.9)
EOSINOPHIL # BLD: 0.2 K/UL (ref 0–0.6)
EOSINOPHIL NFR BLD: 2.5 % (ref 0–5)
ERYTHROCYTE [DISTWIDTH] IN BLOOD BY AUTOMATED COUNT: 13.7 % (ref 11.5–14.5)
GLUCOSE SERPL-MCNC: 94 MG/DL (ref 70–99)
HCT VFR BLD AUTO: 41.5 % (ref 37–47)
HDLC SERPL-MCNC: 52 MG/DL (ref 40–60)
HGB BLD-MCNC: 13.4 G/DL (ref 12–16)
IMM GRANULOCYTES # BLD: 0 K/UL
LDLC SERPL CALC-MCNC: 95 MG/DL
LYMPHOCYTES # BLD: 2.6 K/UL (ref 1.1–4.5)
LYMPHOCYTES NFR BLD: 39 % (ref 20–40)
MCH RBC QN AUTO: 30.8 PG (ref 27–31)
MCHC RBC AUTO-ENTMCNC: 32.3 G/DL (ref 33–37)
MCV RBC AUTO: 95.4 FL (ref 81–99)
MONOCYTES # BLD: 0.6 K/UL (ref 0–0.9)
MONOCYTES NFR BLD: 8.3 % (ref 0–10)
NEUTROPHILS # BLD: 3.3 K/UL (ref 1.5–7.5)
NEUTS SEG NFR BLD: 49.3 % (ref 50–65)
PLATELET # BLD AUTO: 200 K/UL (ref 130–400)
PMV BLD AUTO: 9.6 FL (ref 9.4–12.3)
POTASSIUM SERPL-SCNC: 4.6 MMOL/L (ref 3.5–5.1)
PROT SERPL-MCNC: 6.7 G/DL (ref 6.4–8.3)
RBC # BLD AUTO: 4.35 M/UL (ref 4.2–5.4)
SODIUM SERPL-SCNC: 143 MMOL/L (ref 136–145)
TRIGL SERPL-MCNC: 152 MG/DL (ref 0–149)
TSH SERPL DL<=0.005 MIU/L-ACNC: 1.36 UIU/ML (ref 0.27–4.2)
WBC # BLD AUTO: 6.7 K/UL (ref 4.8–10.8)

## 2025-02-22 LAB — NUCLEAR IGG SER QL IA: NORMAL

## 2025-02-25 SDOH — ECONOMIC STABILITY: FOOD INSECURITY: WITHIN THE PAST 12 MONTHS, THE FOOD YOU BOUGHT JUST DIDN'T LAST AND YOU DIDN'T HAVE MONEY TO GET MORE.: NEVER TRUE

## 2025-02-25 SDOH — ECONOMIC STABILITY: INCOME INSECURITY: IN THE LAST 12 MONTHS, WAS THERE A TIME WHEN YOU WERE NOT ABLE TO PAY THE MORTGAGE OR RENT ON TIME?: NO

## 2025-02-25 SDOH — ECONOMIC STABILITY: TRANSPORTATION INSECURITY
IN THE PAST 12 MONTHS, HAS THE LACK OF TRANSPORTATION KEPT YOU FROM MEDICAL APPOINTMENTS OR FROM GETTING MEDICATIONS?: NO

## 2025-02-25 SDOH — ECONOMIC STABILITY: FOOD INSECURITY: WITHIN THE PAST 12 MONTHS, YOU WORRIED THAT YOUR FOOD WOULD RUN OUT BEFORE YOU GOT MONEY TO BUY MORE.: NEVER TRUE

## 2025-02-25 SDOH — ECONOMIC STABILITY: TRANSPORTATION INSECURITY
IN THE PAST 12 MONTHS, HAS LACK OF TRANSPORTATION KEPT YOU FROM MEETINGS, WORK, OR FROM GETTING THINGS NEEDED FOR DAILY LIVING?: NO

## 2025-02-25 ASSESSMENT — PATIENT HEALTH QUESTIONNAIRE - PHQ9
SUM OF ALL RESPONSES TO PHQ QUESTIONS 1-9: 0
1. LITTLE INTEREST OR PLEASURE IN DOING THINGS: NOT AT ALL
2. FEELING DOWN, DEPRESSED OR HOPELESS: NOT AT ALL
SUM OF ALL RESPONSES TO PHQ9 QUESTIONS 1 & 2: 0
2. FEELING DOWN, DEPRESSED OR HOPELESS: NOT AT ALL
SUM OF ALL RESPONSES TO PHQ9 QUESTIONS 1 & 2: 0
SUM OF ALL RESPONSES TO PHQ QUESTIONS 1-9: 0
1. LITTLE INTEREST OR PLEASURE IN DOING THINGS: NOT AT ALL

## 2025-02-28 ENCOUNTER — HOSPITAL ENCOUNTER (OUTPATIENT)
Dept: CT IMAGING | Age: 75
Discharge: HOME | End: 2025-02-28
Payer: MEDICARE

## 2025-02-28 ENCOUNTER — OFFICE VISIT (OUTPATIENT)
Dept: INTERNAL MEDICINE | Age: 75
End: 2025-02-28

## 2025-02-28 ENCOUNTER — HOSPITAL ENCOUNTER (OUTPATIENT)
Dept: ULTRASOUND IMAGING | Age: 75
Discharge: HOME | End: 2025-02-28
Payer: MEDICARE

## 2025-02-28 VITALS
WEIGHT: 148 LBS | OXYGEN SATURATION: 96 % | SYSTOLIC BLOOD PRESSURE: 138 MMHG | BODY MASS INDEX: 25.27 KG/M2 | DIASTOLIC BLOOD PRESSURE: 72 MMHG | TEMPERATURE: 97.5 F | HEIGHT: 64 IN | HEART RATE: 71 BPM

## 2025-02-28 DIAGNOSIS — E04.2 NONTOXIC MULTINODULAR GOITER: ICD-10-CM

## 2025-02-28 DIAGNOSIS — E55.9 VITAMIN D DEFICIENCY: ICD-10-CM

## 2025-02-28 DIAGNOSIS — E78.5 HYPERLIPIDEMIA, UNSPECIFIED HYPERLIPIDEMIA TYPE: ICD-10-CM

## 2025-02-28 DIAGNOSIS — E04.1 THYROID NODULE: ICD-10-CM

## 2025-02-28 DIAGNOSIS — Z91.09 ENVIRONMENTAL ALLERGIES: ICD-10-CM

## 2025-02-28 DIAGNOSIS — R73.9 HYPERGLYCEMIA: ICD-10-CM

## 2025-02-28 DIAGNOSIS — R93.429 ABNORMAL FINDING ON DIAGNOSTIC IMAGING OF KIDNEY: Primary | ICD-10-CM

## 2025-02-28 DIAGNOSIS — I10 PRIMARY HYPERTENSION: ICD-10-CM

## 2025-02-28 DIAGNOSIS — M54.2 NECK PAIN: ICD-10-CM

## 2025-02-28 DIAGNOSIS — E66.3 OVERWEIGHT (BMI 25.0-29.9): ICD-10-CM

## 2025-02-28 DIAGNOSIS — K21.9 GASTROESOPHAGEAL REFLUX DISEASE WITHOUT ESOPHAGITIS: ICD-10-CM

## 2025-02-28 DIAGNOSIS — R09.A2 GLOBUS SENSATION: ICD-10-CM

## 2025-02-28 PROCEDURE — 60100 BIOPSY OF THYROID: CPT

## 2025-02-28 PROCEDURE — 70491 CT SOFT TISSUE NECK W/DYE: CPT

## 2025-02-28 PROCEDURE — 6360000004 HC RX CONTRAST MEDICATION: Performed by: NURSE PRACTITIONER

## 2025-02-28 RX ORDER — TIZANIDINE HYDROCHLORIDE 2 MG/1
2 CAPSULE, GELATIN COATED ORAL 3 TIMES DAILY
Qty: 30 CAPSULE | Refills: 0 | Status: SHIPPED | OUTPATIENT
Start: 2025-02-28

## 2025-02-28 RX ORDER — IOPAMIDOL 755 MG/ML
75 INJECTION, SOLUTION INTRAVASCULAR
Status: COMPLETED | OUTPATIENT
Start: 2025-02-28 | End: 2025-02-28

## 2025-02-28 RX ADMIN — IOPAMIDOL 75 ML: 755 INJECTION, SOLUTION INTRAVENOUS at 10:38

## 2025-02-28 NOTE — PROGRESS NOTES
Chief Complaint   Patient presents with    6 Month Follow-Up     Maybe pulled muscle in back of neck. Right side pain - Pain scale 8/10  Started not being able to rotate neck and slowly movement but still not doing well    Dermatology this past Tuesday, 2/25 had some stitches put in       HPI: Annie Lomax is a 74 y.o. female is here for follow-up with acid reflux, hypertension and vitamin D deficiency.  She is also taking phentermine for weight loss.  However, it does not appear that she has had this recently.    She recently had biopsy of a thyroid nodule.  She states that her ENT specialist could not get a scope down her throat.  She states that a thyroid ultrasound has been ordered.    She is scheduled to see gastroenterology in April to talk about a colonoscopy and an endoscopy.    She was recently noted to have a complex cyst on the right kidney.  A follow-up CT renal mass protocol in 6 months is planned for April.  At this time, she denies any complaints of abdominal pain or blood in her stools.    She feels like she has pulled a muscle in her neck.  She is unable to turn her neck.  She would like a prescription for Zanaflex.  We can give her this.    She declines a flu vaccine.  She states that she had the flu recently.    She had a skin lesion over her right chest.  Just above the breast.  A biopsy was done by dermatology.  She does have some stitches in place.  The wound is healing without any evidence of infection.    Her acid reflux is well-controlled.  Her blood pressure is well-controlled.  Her allergies have been stable.  She denies any complaints of chest pressure or shortness of breath.  She is taking her vitamin D supplement as prescribed.  She feels well overall and really has no other major complaints or concerns.    Past Medical History:   Diagnosis Date    GERD (gastroesophageal reflux disease)     Hearing loss     Hypertension     Other hemorrhoids     Urinary incontinence       Past

## 2025-03-04 ENCOUNTER — TELEPHONE (OUTPATIENT)
Dept: ENT CLINIC | Age: 75
End: 2025-03-04

## 2025-03-04 NOTE — TELEPHONE ENCOUNTER
Called patient and reviewed thyroid FNA biopsy results. Biopsy came back as Ferris category 2. She voiced understanding. Keep follow up as scheduled.

## 2025-03-27 ENCOUNTER — OFFICE VISIT (OUTPATIENT)
Dept: ENT CLINIC | Age: 75
End: 2025-03-27
Payer: MEDICARE

## 2025-03-27 VITALS
DIASTOLIC BLOOD PRESSURE: 80 MMHG | SYSTOLIC BLOOD PRESSURE: 122 MMHG | HEIGHT: 64 IN | WEIGHT: 159 LBS | BODY MASS INDEX: 27.14 KG/M2

## 2025-03-27 DIAGNOSIS — H93.8X3 ABNORMAL EAR SENSATION, BILATERAL: ICD-10-CM

## 2025-03-27 DIAGNOSIS — Z91.09 ENVIRONMENTAL ALLERGIES: ICD-10-CM

## 2025-03-27 DIAGNOSIS — K21.9 LARYNGOPHARYNGEAL REFLUX (LPR): Primary | ICD-10-CM

## 2025-03-27 DIAGNOSIS — R09.A2 GLOBUS SENSATION: ICD-10-CM

## 2025-03-27 PROCEDURE — 99213 OFFICE O/P EST LOW 20 MIN: CPT | Performed by: NURSE PRACTITIONER

## 2025-03-27 PROCEDURE — 1090F PRES/ABSN URINE INCON ASSESS: CPT | Performed by: NURSE PRACTITIONER

## 2025-03-27 PROCEDURE — 1160F RVW MEDS BY RX/DR IN RCRD: CPT | Performed by: NURSE PRACTITIONER

## 2025-03-27 PROCEDURE — G8417 CALC BMI ABV UP PARAM F/U: HCPCS | Performed by: NURSE PRACTITIONER

## 2025-03-27 PROCEDURE — G8399 PT W/DXA RESULTS DOCUMENT: HCPCS | Performed by: NURSE PRACTITIONER

## 2025-03-27 PROCEDURE — 3017F COLORECTAL CA SCREEN DOC REV: CPT | Performed by: NURSE PRACTITIONER

## 2025-03-27 PROCEDURE — G8427 DOCREV CUR MEDS BY ELIG CLIN: HCPCS | Performed by: NURSE PRACTITIONER

## 2025-03-27 PROCEDURE — 1159F MED LIST DOCD IN RCRD: CPT | Performed by: NURSE PRACTITIONER

## 2025-03-27 PROCEDURE — 1123F ACP DISCUSS/DSCN MKR DOCD: CPT | Performed by: NURSE PRACTITIONER

## 2025-03-27 PROCEDURE — 1036F TOBACCO NON-USER: CPT | Performed by: NURSE PRACTITIONER

## 2025-03-27 RX ORDER — FAMOTIDINE 20 MG/1
20 TABLET, FILM COATED ORAL NIGHTLY
Qty: 30 TABLET | Refills: 1 | Status: SHIPPED | OUTPATIENT
Start: 2025-03-27

## 2025-03-27 RX ORDER — CETIRIZINE HYDROCHLORIDE 10 MG/1
10 TABLET ORAL DAILY
Qty: 30 TABLET | Refills: 1 | Status: SHIPPED | OUTPATIENT
Start: 2025-03-27

## 2025-03-27 ASSESSMENT — ENCOUNTER SYMPTOMS
EYES NEGATIVE: 1
GASTROINTESTINAL NEGATIVE: 1
TROUBLE SWALLOWING: 1
RESPIRATORY NEGATIVE: 1
ALLERGIC/IMMUNOLOGIC NEGATIVE: 1
COUGH: 0

## 2025-03-27 NOTE — PROGRESS NOTES
3/27/2025    Annie Lomax (:  1950) is a 74 y.o. female, Established patient, here for evaluation of the following chief complaint(s):  Follow-up (LPR, ears)      Vitals:    25 1320   BP: 122/80   Weight: 72.1 kg (159 lb)   Height: 1.626 m (5' 4.02\")       Wt Readings from Last 3 Encounters:   25 72.1 kg (159 lb)   25 67.1 kg (148 lb)   25 68.9 kg (152 lb)       BP Readings from Last 3 Encounters:   25 122/80   25 138/72   25 118/78         SUBJECTIVE/OBJECTIVE:    Patient seen today for follow up of her throat. She was started on ipratropium at her last visit. She reports that the drainage down her throat and her cough is better. She does still complain of globus sensation and her throat feeling tight when she swallows. She denies difficulty swallowing. She did have a CT of her neck that has not been read by radiology. She is currently on Nexium. She is scheduled with her GI doctor next month. She also complains of a crawling sensation in her ears when she gets up in the mornings. She denies ear pain, aural fullness, or muffled hearing. She does report she can not hear well and has had tinnitus for a few years. She denies history of noise exposure.         Review of Systems   Constitutional: Negative.    HENT:  Positive for hearing loss, tinnitus and trouble swallowing (throat feels tight when swallowing). Negative for ear pain and postnasal drip.         Globus sensation  Crawling sensation in ears   Eyes: Negative.    Respiratory: Negative.  Negative for cough.    Cardiovascular: Negative.    Gastrointestinal: Negative.    Endocrine: Negative.    Musculoskeletal: Negative.    Skin: Negative.    Allergic/Immunologic: Negative.    Neurological: Negative.    Hematological: Negative.    Psychiatric/Behavioral: Negative.          Physical Exam  Vitals reviewed.   Constitutional:       Appearance: Normal appearance. She is normal weight.   HENT:      Head:

## 2025-04-07 ENCOUNTER — TELEPHONE (OUTPATIENT)
Dept: ENT CLINIC | Age: 75
End: 2025-04-07

## 2025-04-07 NOTE — TELEPHONE ENCOUNTER
Called patient and reviewed CT results of neck. CT was negative for adenopathy but showed bubbles of air within the right strap muscle at the level of the thyroid cartilage and thyroid gland. Recommend 3 month follow up CT of neck. She voiced understanding and is agreeable. She reports that she does have an appointment with her GI doctor coming up. Keep follow up with us as scheduled.

## 2025-04-08 ENCOUNTER — TRANSCRIBE ORDERS (OUTPATIENT)
Dept: ADMINISTRATIVE | Facility: HOSPITAL | Age: 75
End: 2025-04-08
Payer: MEDICARE

## 2025-04-08 DIAGNOSIS — R93.429 ABNORMAL RADIOLOGIC FINDINGS ON DIAGNOSTIC IMAGING OF UNSPECIFIED KIDNEY: Primary | ICD-10-CM

## 2025-04-25 ENCOUNTER — OFFICE VISIT (OUTPATIENT)
Dept: INTERNAL MEDICINE | Age: 75
End: 2025-04-25

## 2025-04-25 ENCOUNTER — OFFICE VISIT (OUTPATIENT)
Dept: ENT CLINIC | Age: 75
End: 2025-04-25
Payer: MEDICARE

## 2025-04-25 VITALS
HEIGHT: 64 IN | DIASTOLIC BLOOD PRESSURE: 98 MMHG | OXYGEN SATURATION: 99 % | HEART RATE: 70 BPM | BODY MASS INDEX: 27.39 KG/M2 | WEIGHT: 160.4 LBS | SYSTOLIC BLOOD PRESSURE: 171 MMHG

## 2025-04-25 VITALS
BODY MASS INDEX: 27.14 KG/M2 | HEIGHT: 64 IN | DIASTOLIC BLOOD PRESSURE: 90 MMHG | WEIGHT: 159 LBS | SYSTOLIC BLOOD PRESSURE: 170 MMHG

## 2025-04-25 DIAGNOSIS — H93.8X3 ABNORMAL EAR SENSATION, BILATERAL: ICD-10-CM

## 2025-04-25 DIAGNOSIS — K21.9 LARYNGOPHARYNGEAL REFLUX (LPR): Primary | ICD-10-CM

## 2025-04-25 DIAGNOSIS — R93.89 ABNORMAL FINDING ON IMAGING: ICD-10-CM

## 2025-04-25 DIAGNOSIS — I10 PRIMARY HYPERTENSION: Primary | ICD-10-CM

## 2025-04-25 DIAGNOSIS — R09.A2 GLOBUS SENSATION: ICD-10-CM

## 2025-04-25 DIAGNOSIS — R09.82 POSTNASAL DRIP: ICD-10-CM

## 2025-04-25 DIAGNOSIS — Z91.09 ENVIRONMENTAL ALLERGIES: ICD-10-CM

## 2025-04-25 PROCEDURE — 1123F ACP DISCUSS/DSCN MKR DOCD: CPT | Performed by: NURSE PRACTITIONER

## 2025-04-25 PROCEDURE — 3017F COLORECTAL CA SCREEN DOC REV: CPT | Performed by: NURSE PRACTITIONER

## 2025-04-25 PROCEDURE — 1159F MED LIST DOCD IN RCRD: CPT | Performed by: NURSE PRACTITIONER

## 2025-04-25 PROCEDURE — G8417 CALC BMI ABV UP PARAM F/U: HCPCS | Performed by: NURSE PRACTITIONER

## 2025-04-25 PROCEDURE — G8427 DOCREV CUR MEDS BY ELIG CLIN: HCPCS | Performed by: NURSE PRACTITIONER

## 2025-04-25 PROCEDURE — 1090F PRES/ABSN URINE INCON ASSESS: CPT | Performed by: NURSE PRACTITIONER

## 2025-04-25 PROCEDURE — 1160F RVW MEDS BY RX/DR IN RCRD: CPT | Performed by: NURSE PRACTITIONER

## 2025-04-25 PROCEDURE — 99214 OFFICE O/P EST MOD 30 MIN: CPT | Performed by: NURSE PRACTITIONER

## 2025-04-25 PROCEDURE — G8399 PT W/DXA RESULTS DOCUMENT: HCPCS | Performed by: NURSE PRACTITIONER

## 2025-04-25 PROCEDURE — 1036F TOBACCO NON-USER: CPT | Performed by: NURSE PRACTITIONER

## 2025-04-25 RX ORDER — CETIRIZINE HYDROCHLORIDE 10 MG/1
10 TABLET ORAL DAILY
Qty: 30 TABLET | Refills: 1 | Status: SHIPPED | OUTPATIENT
Start: 2025-04-25

## 2025-04-25 RX ORDER — FAMOTIDINE 20 MG/1
20 TABLET, FILM COATED ORAL NIGHTLY
Qty: 90 TABLET | Refills: 1 | Status: SHIPPED | OUTPATIENT
Start: 2025-04-25

## 2025-04-25 RX ORDER — CLONIDINE HYDROCHLORIDE 0.1 MG/1
0.1 TABLET ORAL ONCE
Status: COMPLETED | OUTPATIENT
Start: 2025-04-25 | End: 2025-04-25

## 2025-04-25 RX ORDER — IPRATROPIUM BROMIDE 21 UG/1
2 SPRAY, METERED NASAL EVERY 12 HOURS
Qty: 30 ML | Refills: 5 | Status: SHIPPED | OUTPATIENT
Start: 2025-04-25

## 2025-04-25 RX ORDER — CLONIDINE HYDROCHLORIDE 0.1 MG/1
0.1 TABLET ORAL 2 TIMES DAILY
Qty: 60 TABLET | Refills: 0 | Status: SHIPPED | OUTPATIENT
Start: 2025-04-25

## 2025-04-25 RX ORDER — LOSARTAN POTASSIUM 50 MG/1
50 TABLET ORAL 2 TIMES DAILY
Qty: 60 TABLET | Refills: 1 | Status: SHIPPED | OUTPATIENT
Start: 2025-04-25

## 2025-04-25 RX ADMIN — CLONIDINE HYDROCHLORIDE 0.1 MG: 0.1 TABLET ORAL at 11:42

## 2025-04-25 ASSESSMENT — ENCOUNTER SYMPTOMS
COUGH: 0
TROUBLE SWALLOWING: 0
SINUS PAIN: 0
WHEEZING: 0
RESPIRATORY NEGATIVE: 1
BACK PAIN: 0
EYE DISCHARGE: 0
EYES NEGATIVE: 1
SINUS PRESSURE: 0
ANAL BLEEDING: 0
ALLERGIC/IMMUNOLOGIC NEGATIVE: 1
CONSTIPATION: 0
ABDOMINAL DISTENTION: 0
RECTAL PAIN: 0
EYE PAIN: 0
CHOKING: 0
SHORTNESS OF BREATH: 0
FACIAL SWELLING: 0
EYE ITCHING: 0
GASTROINTESTINAL NEGATIVE: 1
RHINORRHEA: 0
VOICE CHANGE: 0
BLOOD IN STOOL: 0
TROUBLE SWALLOWING: 0
ABDOMINAL PAIN: 0
SORE THROAT: 0
COLOR CHANGE: 0
EYE REDNESS: 0
CHEST TIGHTNESS: 0
VOMITING: 0
DIARRHEA: 0
PHOTOPHOBIA: 0
NAUSEA: 0

## 2025-04-25 NOTE — PROGRESS NOTES
2025    Annie Lomax (:  1950) is a 74 y.o. female, Established patient, here for evaluation of the following chief complaint(s):  Follow-up (ears)      Vitals:    25 1042   BP: (!) 170/90   Weight: 72.1 kg (159 lb)   Height: 1.626 m (5' 4.02\")       Wt Readings from Last 3 Encounters:   25 72.1 kg (159 lb)   25 72.1 kg (159 lb)   25 67.1 kg (148 lb)       BP Readings from Last 3 Encounters:   25 (!) 170/90   25 122/80   25 138/72         SUBJECTIVE/OBJECTIVE:    Patient seen today for follow up of her throat and ears. She was prescribed Zyrtec at her last visit for her ears, but she did not receive this from her pharmacy. She is on ipratropium and feels this is doing well for her drainage. She does report some drainage down her throat still and she will cough it up at times. She reports the crawling sensation in her ears is getting better. She does have long standing hearing loss and tinnitus. She was started on famotidine at her last visit for reflux. She is also on Nexium. She reports that she does still have intermittent globus sensation, but does feel it is improving. She reports her appointment with her GI doctor got pushed back to . She does not feel her throat gets tight when she swallows anymore. She denies difficulty swallowing. She does report history of hiatal hernia. She had a CT of her neck done in February that showed multiple bubbles of air within the right strap muscle at level the thyroid cartilage and thyroid gland.        Review of Systems   Constitutional: Negative.    HENT:  Positive for hearing loss, postnasal drip and tinnitus. Negative for trouble swallowing.         Intermittent globus sensation  Crawling sensation in ears   Eyes: Negative.    Respiratory: Negative.     Cardiovascular: Negative.    Gastrointestinal: Negative.    Endocrine: Negative.    Musculoskeletal: Negative.    Skin: Negative.    Allergic/Immunologic:

## 2025-04-25 NOTE — PROGRESS NOTES
Administered Clonidine 0.1mg tablet orally. Observed for 15-20 minutes. BP rechecked 146/85      NDC 20756-553-81  EXP 05/01/2025  LOT [id number]  
(CARDIA)     Difficulty of Paying Living Expenses: Not hard at all   Food Insecurity: No Food Insecurity (2/25/2025)    Hunger Vital Sign     Worried About Running Out of Food in the Last Year: Never true     Ran Out of Food in the Last Year: Never true   Transportation Needs: No Transportation Needs (2/25/2025)    PRAPARE - Transportation     Lack of Transportation (Medical): No     Lack of Transportation (Non-Medical): No   Physical Activity: Insufficiently Active (8/29/2024)    Exercise Vital Sign     Days of Exercise per Week: 1 day     Minutes of Exercise per Session: 10 min    Received from [facility]    Family and Community Support    Received from [facility]    Abuse Screen   Housing Stability: Low Risk  (2/25/2025)    Housing Stability Vital Sign     Unable to Pay for Housing in the Last Year: No     Number of Times Moved in the Last Year: 0     Homeless in the Last Year: No      Family History   Problem Relation Age of Onset    Dementia Mother     Other Father         Car accident    Other Sister         Covid dod 2021    Cancer Brother         Lung cancer dod 1993    Cancer Brother         Lung cancer, stomach, adrenal cancer dod 2024        Current Outpatient Medications   Medication Sig Dispense Refill    cetirizine (ZYRTEC) 10 MG tablet Take 1 tablet by mouth daily 30 tablet 1    famotidine (PEPCID) 20 MG tablet Take 1 tablet by mouth at bedtime 90 tablet 1    ipratropium (ATROVENT) 0.03 % nasal spray 2 sprays by Each Nostril route in the morning and 2 sprays in the evening. 30 mL 5    tiZANidine (ZANAFLEX) 2 MG capsule Take 1 capsule by mouth 3 times daily 30 capsule 0    losartan (COZAAR) 50 MG tablet Take 1 tablet by mouth daily 90 tablet 1    metoprolol succinate (TOPROL XL) 100 MG extended release tablet Take 0.5 tablets by mouth 2 times daily 90 tablet 1    esomeprazole (NEXIUM) 20 MG delayed release capsule Take

## 2025-04-29 ENCOUNTER — TRANSCRIBE ORDERS (OUTPATIENT)
Dept: ADMINISTRATIVE | Facility: HOSPITAL | Age: 75
End: 2025-04-29
Payer: MEDICARE

## 2025-04-29 DIAGNOSIS — R93.89 ABNORMAL FINDING ON IMAGING: Primary | ICD-10-CM

## 2025-05-01 ENCOUNTER — HOSPITAL ENCOUNTER (OUTPATIENT)
Dept: CT IMAGING | Facility: HOSPITAL | Age: 75
Discharge: HOME | End: 2025-05-01
Admitting: INTERNAL MEDICINE
Payer: MEDICARE

## 2025-05-01 DIAGNOSIS — R93.429 ABNORMAL RADIOLOGIC FINDINGS ON DIAGNOSTIC IMAGING OF UNSPECIFIED KIDNEY: ICD-10-CM

## 2025-05-01 PROCEDURE — 25510000001 IOPAMIDOL PER 1 ML: Performed by: INTERNAL MEDICINE

## 2025-05-01 PROCEDURE — 74178 CT ABD&PLV WO CNTR FLWD CNTR: CPT

## 2025-05-01 RX ORDER — IOPAMIDOL 755 MG/ML
100 INJECTION, SOLUTION INTRAVASCULAR
Status: COMPLETED | OUTPATIENT
Start: 2025-05-01 | End: 2025-05-01

## 2025-05-01 RX ADMIN — IOPAMIDOL 100 ML: 755 INJECTION, SOLUTION INTRAVENOUS at 10:43

## 2025-05-02 ENCOUNTER — OFFICE VISIT (OUTPATIENT)
Dept: INTERNAL MEDICINE | Age: 75
End: 2025-05-02

## 2025-05-02 VITALS
HEART RATE: 70 BPM | DIASTOLIC BLOOD PRESSURE: 84 MMHG | HEIGHT: 64 IN | WEIGHT: 160.8 LBS | SYSTOLIC BLOOD PRESSURE: 138 MMHG | OXYGEN SATURATION: 99 % | BODY MASS INDEX: 27.45 KG/M2

## 2025-05-02 DIAGNOSIS — I10 PRIMARY HYPERTENSION: Primary | ICD-10-CM

## 2025-05-02 DIAGNOSIS — R07.9 CHEST PAIN, UNSPECIFIED TYPE: ICD-10-CM

## 2025-05-02 DIAGNOSIS — R07.89 CHEST TIGHTNESS: ICD-10-CM

## 2025-05-02 RX ORDER — AMLODIPINE BESYLATE 5 MG/1
5 TABLET ORAL DAILY
Qty: 30 TABLET | Refills: 3 | Status: SHIPPED | OUTPATIENT
Start: 2025-05-02

## 2025-05-02 NOTE — PROGRESS NOTES
by mouth 2 times daily 90 tablet 1    esomeprazole (NEXIUM) 20 MG delayed release capsule Take 1 capsule by mouth in the morning and 1 capsule in the evening. 60 capsule 5    hydroCHLOROthiazide 12.5 MG capsule Take 1 capsule by mouth every morning 30 capsule 1    triamcinolone (NASACORT) 55 MCG/ACT nasal inhaler 2 sprays by Each Nostril route 2 times daily 16 g 1    magnesium (MAGNESIUM-OXIDE) 250 MG TABS tablet Take 1 tablet by mouth daily      Probiotic Product (PROBIOTIC DAILY PO) Take by mouth      ascorbic acid (VITAMIN C) 500 MG tablet Take 1 tablet by mouth daily      Biotin 5 MG CAPS Take by mouth      calcium carbonate 1500 (600 Ca) MG TABS tablet Take 1 tablet by mouth daily      vitamin D (CHOLECALCIFEROL) 25 MCG (1000 UT) TABS tablet Take 1 tablet by mouth daily      Cyanocobalamin 1000 MCG CAPS Take by mouth       No current facility-administered medications for this visit.        Patient Active Problem List   Diagnosis    Follicular cyst of skin    Globus sensation    Laryngopharyngeal reflux (LPR)    Oral thrush    MARGIE (middle ear effusion), left    Mass of axillary tail of left breast        Review of Systems   Constitutional:  Negative for activity change, appetite change, chills, diaphoresis, fatigue, fever and unexpected weight change.   HENT:  Negative for congestion, ear pain, facial swelling, hearing loss, mouth sores, nosebleeds, postnasal drip, rhinorrhea, sinus pressure, sinus pain, sneezing, sore throat, tinnitus, trouble swallowing and voice change.         .  She does feel like there is something pressing on her trachea.     Eyes:  Negative for photophobia, pain, discharge, redness, itching and visual disturbance.        Dry eyes   Respiratory:  Negative for cough, choking, chest tightness, shortness of breath and wheezing.    Cardiovascular:  Negative for chest pain, palpitations and leg swelling.        Complaint of pressure to the middle of her chest.   Gastrointestinal:  Negative for

## 2025-05-03 SDOH — ECONOMIC STABILITY: FOOD INSECURITY: WITHIN THE PAST 12 MONTHS, THE FOOD YOU BOUGHT JUST DIDN'T LAST AND YOU DIDN'T HAVE MONEY TO GET MORE.: NEVER TRUE

## 2025-05-03 SDOH — ECONOMIC STABILITY: FOOD INSECURITY: WITHIN THE PAST 12 MONTHS, YOU WORRIED THAT YOUR FOOD WOULD RUN OUT BEFORE YOU GOT MONEY TO BUY MORE.: NEVER TRUE

## 2025-05-03 ASSESSMENT — ENCOUNTER SYMPTOMS
COUGH: 0
PHOTOPHOBIA: 0
SINUS PAIN: 0
EYE REDNESS: 0
COLOR CHANGE: 0
ABDOMINAL DISTENTION: 0
SHORTNESS OF BREATH: 0
ABDOMINAL PAIN: 0
SINUS PRESSURE: 0
CHOKING: 0
RECTAL PAIN: 0
CHEST TIGHTNESS: 0
TROUBLE SWALLOWING: 0
VOICE CHANGE: 0
CONSTIPATION: 0
FACIAL SWELLING: 0
EYE DISCHARGE: 0
DIARRHEA: 0
ANAL BLEEDING: 0
NAUSEA: 0
EYE ITCHING: 0
EYE PAIN: 0
RHINORRHEA: 0
WHEEZING: 0
BLOOD IN STOOL: 0
BACK PAIN: 0
SORE THROAT: 0
VOMITING: 0

## 2025-05-03 ASSESSMENT — PATIENT HEALTH QUESTIONNAIRE - PHQ9
SUM OF ALL RESPONSES TO PHQ QUESTIONS 1-9: 0
1. LITTLE INTEREST OR PLEASURE IN DOING THINGS: NOT AT ALL
2. FEELING DOWN, DEPRESSED OR HOPELESS: NOT AT ALL

## 2025-05-08 ENCOUNTER — TELEPHONE (OUTPATIENT)
Dept: INTERNAL MEDICINE | Age: 75
End: 2025-05-08

## 2025-05-08 NOTE — TELEPHONE ENCOUNTER
Her CT scan shows some kidney cyst.  Otherwise, there really was not anything other than fatty liver.  We can repeat a CT scan in about 6 months.

## 2025-05-19 ENCOUNTER — TELEPHONE (OUTPATIENT)
Dept: INTERNAL MEDICINE | Age: 75
End: 2025-05-19

## 2025-05-19 DIAGNOSIS — I10 PRIMARY HYPERTENSION: ICD-10-CM

## 2025-05-19 DIAGNOSIS — K21.9 GASTROESOPHAGEAL REFLUX DISEASE WITHOUT ESOPHAGITIS: ICD-10-CM

## 2025-05-19 RX ORDER — LOSARTAN POTASSIUM 50 MG/1
50 TABLET ORAL 2 TIMES DAILY
Qty: 60 TABLET | Refills: 1 | Status: SHIPPED | OUTPATIENT
Start: 2025-05-19

## 2025-05-19 NOTE — TELEPHONE ENCOUNTER
Patient request refills   Losartan 50 mg  Esomeprazole 20mg   Send through [facility]

## 2025-06-02 ENCOUNTER — OFFICE VISIT (OUTPATIENT)
Dept: INTERNAL MEDICINE | Age: 75
End: 2025-06-02
Payer: MEDICARE

## 2025-06-02 VITALS
SYSTOLIC BLOOD PRESSURE: 120 MMHG | HEIGHT: 64 IN | HEART RATE: 69 BPM | WEIGHT: 162 LBS | DIASTOLIC BLOOD PRESSURE: 75 MMHG | BODY MASS INDEX: 27.66 KG/M2 | OXYGEN SATURATION: 97 %

## 2025-06-02 DIAGNOSIS — I10 PRIMARY HYPERTENSION: Primary | ICD-10-CM

## 2025-06-02 PROCEDURE — G8399 PT W/DXA RESULTS DOCUMENT: HCPCS | Performed by: INTERNAL MEDICINE

## 2025-06-02 PROCEDURE — 1159F MED LIST DOCD IN RCRD: CPT | Performed by: INTERNAL MEDICINE

## 2025-06-02 PROCEDURE — 3017F COLORECTAL CA SCREEN DOC REV: CPT | Performed by: INTERNAL MEDICINE

## 2025-06-02 PROCEDURE — G8427 DOCREV CUR MEDS BY ELIG CLIN: HCPCS | Performed by: INTERNAL MEDICINE

## 2025-06-02 PROCEDURE — 1123F ACP DISCUSS/DSCN MKR DOCD: CPT | Performed by: INTERNAL MEDICINE

## 2025-06-02 PROCEDURE — 3078F DIAST BP <80 MM HG: CPT | Performed by: INTERNAL MEDICINE

## 2025-06-02 PROCEDURE — 1036F TOBACCO NON-USER: CPT | Performed by: INTERNAL MEDICINE

## 2025-06-02 PROCEDURE — 1090F PRES/ABSN URINE INCON ASSESS: CPT | Performed by: INTERNAL MEDICINE

## 2025-06-02 PROCEDURE — 3074F SYST BP LT 130 MM HG: CPT | Performed by: INTERNAL MEDICINE

## 2025-06-02 PROCEDURE — G8417 CALC BMI ABV UP PARAM F/U: HCPCS | Performed by: INTERNAL MEDICINE

## 2025-06-02 PROCEDURE — 99213 OFFICE O/P EST LOW 20 MIN: CPT | Performed by: INTERNAL MEDICINE

## 2025-06-02 ASSESSMENT — ENCOUNTER SYMPTOMS
ABDOMINAL DISTENTION: 0
COLOR CHANGE: 0
COUGH: 0
RHINORRHEA: 0
NAUSEA: 0
ABDOMINAL PAIN: 0
WHEEZING: 0
SHORTNESS OF BREATH: 0
CHEST TIGHTNESS: 0
PHOTOPHOBIA: 0
VOMITING: 0
VOICE CHANGE: 0
EYE REDNESS: 0
TROUBLE SWALLOWING: 0
BLOOD IN STOOL: 0
ANAL BLEEDING: 0
DIARRHEA: 0
RECTAL PAIN: 0
EYE ITCHING: 0
CONSTIPATION: 0
CHOKING: 0
BACK PAIN: 0
SORE THROAT: 0
SINUS PRESSURE: 0
EYE PAIN: 0
FACIAL SWELLING: 0
EYE DISCHARGE: 0
SINUS PAIN: 0

## 2025-06-02 NOTE — PROGRESS NOTES
Chief Complaint   Patient presents with    Follow-up     Pt says that her bp is better        HPI: Annie Lomax is a 74 y.o. female is here for hypertension.  Her blood pressure is much better controlled.  She still has some episodes of chest pressure.  She is scheduled to get a Lexiscan in the near future.  She really has no other concerns or complaints at this time.    Past Medical History:   Diagnosis Date    GERD (gastroesophageal reflux disease)     Hearing loss     Hypertension     Other hemorrhoids     Urinary incontinence       Past Surgical History:   Procedure Laterality Date    APPENDECTOMY      BLADDER SURGERY  09/2011    bladder sling, vaginal prolaspe, rectocele, cystocele, enterocele    BREAST SURGERY Left 12/2007    duct removed.    CYST REMOVAL Left 07/2016    breast    GROIN SURGERY Right 02/15/2022    EXCISION RIGHT GROIN FOLLICULAR CYST performed by Neha Aparicio DO at [facility] OR    HYSTERECTOMY (CERVIX STATUS UNKNOWN)  1976    HYSTERECTOMY, TOTAL ABDOMINAL (CERVIX REMOVED)      HYSTERECTOMY, VAGINAL      OVARY REMOVAL Bilateral 1980    age 30    RECTOCELE REPAIR  09/2011    TONSILLECTOMY  1968    US BIOPSY THYROID  2/28/2025    US BIOPSY THYROID 2/28/2025 [facility] ULTRASOUND    US BREAST FINE NEEDLE ASPIRATION Left 2016    benign      Social History     Socioeconomic History    Marital status:    Tobacco Use    Smoking status: Never    Smokeless tobacco: Never   Vaping Use    Vaping status: Never Used   Substance and Sexual Activity    Alcohol use: Never    Drug use: Never    Sexual activity: Yes     Partners: Male     Social Drivers of Health     Financial Resource Strain: Low Risk  (12/31/2024)    Overall Financial Resource Strain (CARDIA)     Difficulty of Paying Living Expenses: Not hard at all   Food Insecurity: No Food Insecurity (5/3/2025)    Hunger Vital Sign     Worried About Running Out of Food in the Last Year: Never true     Ran Out of Food in the Last Year: Never true

## 2025-06-05 ENCOUNTER — HOSPITAL ENCOUNTER (OUTPATIENT)
Dept: CT IMAGING | Facility: HOSPITAL | Age: 75
Discharge: HOME | End: 2025-06-05
Admitting: NURSE PRACTITIONER
Payer: MEDICARE

## 2025-06-05 DIAGNOSIS — R93.89 ABNORMAL FINDING ON IMAGING: ICD-10-CM

## 2025-06-05 PROCEDURE — 25510000001 IOPAMIDOL 61 % SOLUTION: Performed by: NURSE PRACTITIONER

## 2025-06-05 PROCEDURE — 70491 CT SOFT TISSUE NECK W/DYE: CPT

## 2025-06-05 RX ORDER — IOPAMIDOL 612 MG/ML
100 INJECTION, SOLUTION INTRAVASCULAR
Status: COMPLETED | OUTPATIENT
Start: 2025-06-05 | End: 2025-06-05

## 2025-06-05 RX ADMIN — IOPAMIDOL 100 ML: 612 INJECTION, SOLUTION INTRAVENOUS at 13:01

## 2025-06-06 ENCOUNTER — TELEPHONE (OUTPATIENT)
Dept: INTERNAL MEDICINE | Age: 75
End: 2025-06-06

## 2025-06-06 RX ORDER — AMLODIPINE BESYLATE 5 MG/1
5 TABLET ORAL DAILY
Qty: 30 TABLET | Refills: 5 | Status: SHIPPED | OUTPATIENT
Start: 2025-06-06

## 2025-06-06 NOTE — TELEPHONE ENCOUNTER
Patient calling 6/6/25 requesting a refill of their     amLODIPine (NORVASC) 5 MG tablet    medication. Patient would like it sent to [facility] [place] [facility] [address] [facility] - P [phone] - F [phone]  .

## 2025-06-24 ENCOUNTER — HOSPITAL ENCOUNTER (OUTPATIENT)
Dept: NUCLEAR MEDICINE | Age: 75
Discharge: HOME | End: 2025-06-26
Attending: INTERNAL MEDICINE
Payer: MEDICARE

## 2025-06-24 ENCOUNTER — OFFICE VISIT (OUTPATIENT)
Dept: ENT CLINIC | Age: 75
End: 2025-06-24
Payer: MEDICARE

## 2025-06-24 ENCOUNTER — RESULTS FOLLOW-UP (OUTPATIENT)
Dept: INTERNAL MEDICINE | Age: 75
End: 2025-06-24

## 2025-06-24 VITALS
DIASTOLIC BLOOD PRESSURE: 86 MMHG | SYSTOLIC BLOOD PRESSURE: 134 MMHG | BODY MASS INDEX: 27.69 KG/M2 | WEIGHT: 162.2 LBS | HEIGHT: 64 IN

## 2025-06-24 DIAGNOSIS — K21.9 LARYNGOPHARYNGEAL REFLUX (LPR): Primary | ICD-10-CM

## 2025-06-24 DIAGNOSIS — R07.9 CHEST PAIN, UNSPECIFIED TYPE: ICD-10-CM

## 2025-06-24 DIAGNOSIS — Z91.09 ENVIRONMENTAL ALLERGIES: ICD-10-CM

## 2025-06-24 LAB
NUC STRESS EJECTION FRACTION: 69 %
STRESS BASELINE DIAS BP: 98 MMHG
STRESS BASELINE HR: 58 BPM
STRESS BASELINE SYS BP: 188 MMHG
STRESS ESTIMATED WORKLOAD: 1 METS
STRESS EXERCISE DUR MIN: 5 MIN
STRESS EXERCISE DUR SEC: 0 SEC
STRESS PEAK DIAS BP: 93 MMHG
STRESS PEAK SYS BP: 213 MMHG
STRESS PERCENT HR ACHIEVED: 64 %
STRESS POST PEAK HR: 93 BPM
STRESS RATE PRESSURE PRODUCT: NORMAL BPM*MMHG
STRESS TARGET HR: 145 BPM

## 2025-06-24 PROCEDURE — A9502 TC99M TETROFOSMIN: HCPCS | Performed by: INTERNAL MEDICINE

## 2025-06-24 PROCEDURE — 99213 OFFICE O/P EST LOW 20 MIN: CPT | Performed by: NURSE PRACTITIONER

## 2025-06-24 PROCEDURE — 3017F COLORECTAL CA SCREEN DOC REV: CPT | Performed by: NURSE PRACTITIONER

## 2025-06-24 PROCEDURE — 1036F TOBACCO NON-USER: CPT | Performed by: NURSE PRACTITIONER

## 2025-06-24 PROCEDURE — 1123F ACP DISCUSS/DSCN MKR DOCD: CPT | Performed by: NURSE PRACTITIONER

## 2025-06-24 PROCEDURE — 78452 HT MUSCLE IMAGE SPECT MULT: CPT | Performed by: INTERNAL MEDICINE

## 2025-06-24 PROCEDURE — 1159F MED LIST DOCD IN RCRD: CPT | Performed by: NURSE PRACTITIONER

## 2025-06-24 PROCEDURE — G8417 CALC BMI ABV UP PARAM F/U: HCPCS | Performed by: NURSE PRACTITIONER

## 2025-06-24 PROCEDURE — 93017 CV STRESS TEST TRACING ONLY: CPT

## 2025-06-24 PROCEDURE — 6360000002 HC RX W HCPCS: Performed by: INTERNAL MEDICINE

## 2025-06-24 PROCEDURE — 1090F PRES/ABSN URINE INCON ASSESS: CPT | Performed by: NURSE PRACTITIONER

## 2025-06-24 PROCEDURE — 93018 CV STRESS TEST I&R ONLY: CPT | Performed by: INTERNAL MEDICINE

## 2025-06-24 PROCEDURE — G8399 PT W/DXA RESULTS DOCUMENT: HCPCS | Performed by: NURSE PRACTITIONER

## 2025-06-24 PROCEDURE — 93016 CV STRESS TEST SUPVJ ONLY: CPT | Performed by: INTERNAL MEDICINE

## 2025-06-24 PROCEDURE — 1160F RVW MEDS BY RX/DR IN RCRD: CPT | Performed by: NURSE PRACTITIONER

## 2025-06-24 PROCEDURE — 3430000000 HC RX DIAGNOSTIC RADIOPHARMACEUTICAL: Performed by: INTERNAL MEDICINE

## 2025-06-24 PROCEDURE — G8427 DOCREV CUR MEDS BY ELIG CLIN: HCPCS | Performed by: NURSE PRACTITIONER

## 2025-06-24 RX ORDER — CETIRIZINE HYDROCHLORIDE 10 MG/1
10 TABLET ORAL DAILY
Qty: 30 TABLET | Refills: 1 | Status: SHIPPED | OUTPATIENT
Start: 2025-06-24

## 2025-06-24 RX ORDER — REGADENOSON 0.08 MG/ML
0.4 INJECTION, SOLUTION INTRAVENOUS
Status: COMPLETED | OUTPATIENT
Start: 2025-06-24 | End: 2025-06-24

## 2025-06-24 RX ORDER — PANTOPRAZOLE SODIUM 40 MG/1
TABLET, DELAYED RELEASE ORAL
COMMUNITY
Start: 2025-06-04

## 2025-06-24 RX ORDER — SUCRALFATE 1 G/1
TABLET ORAL
COMMUNITY
Start: 2025-06-13

## 2025-06-24 RX ADMIN — TETROFOSMIN 8 MILLICURIE: 0.23 INJECTION, POWDER, LYOPHILIZED, FOR SOLUTION INTRAVENOUS at 10:44

## 2025-06-24 RX ADMIN — TETROFOSMIN 24 MILLICURIE: 0.23 INJECTION, POWDER, LYOPHILIZED, FOR SOLUTION INTRAVENOUS at 10:44

## 2025-06-24 RX ADMIN — REGADENOSON 0.4 MG: 0.08 INJECTION, SOLUTION INTRAVENOUS at 09:53

## 2025-06-24 ASSESSMENT — ENCOUNTER SYMPTOMS
EYES NEGATIVE: 1
TROUBLE SWALLOWING: 1
ALLERGIC/IMMUNOLOGIC NEGATIVE: 1
GASTROINTESTINAL NEGATIVE: 1
RESPIRATORY NEGATIVE: 1

## 2025-06-24 NOTE — PROGRESS NOTES
2025    Annie Lomax (:  1950) is a 74 y.o. female, Established patient, here for evaluation of the following chief complaint(s):  Follow-up (LPR, Allergies)      Vitals:    25 1128   BP: 134/86   Weight: 73.6 kg (162 lb 3.2 oz)   Height: 1.626 m (5' 4\")       Wt Readings from Last 3 Encounters:   25 73.6 kg (162 lb 3.2 oz)   25 73.5 kg (162 lb)   25 72.9 kg (160 lb 12.8 oz)       BP Readings from Last 3 Encounters:   25 134/86   25 120/75   25 138/84         SUBJECTIVE/OBJECTIVE:    Patient seen today for follow up of her throat and ears. She was given Zyrtec at her last visit, but [facility] so she has not gotten it yet. She has been on ipratropium and famotidine. She reports these are doing well for her. She did have a follow up CT of her neck that did not show any acute abnormality. She did recently have an EGD done at [facility] last week. She reports that she thinks they stretched her esophagus.She does have a follow up with GI next month. She reports that she does have a history of hiatal hernia. She reports occasional trouble swallowing and globus sensation.        Review of Systems   Constitutional: Negative.    HENT:  Positive for trouble swallowing (occasional).         Globus sensation   Eyes: Negative.    Respiratory: Negative.     Cardiovascular: Negative.    Gastrointestinal: Negative.    Endocrine: Negative.    Musculoskeletal: Negative.    Skin: Negative.    Allergic/Immunologic: Negative.    Neurological: Negative.    Hematological: Negative.    Psychiatric/Behavioral: Negative.          Physical Exam  Vitals reviewed.   Constitutional:       Appearance: Normal appearance. She is normal weight.   HENT:      Head: Normocephalic and atraumatic.      Right Ear: Tympanic membrane, ear canal and external ear normal.      Left Ear: Tympanic membrane, ear canal and external ear normal.      Nose: Nose normal.      Mouth/Throat:

## 2025-07-11 ENCOUNTER — TELEPHONE (OUTPATIENT)
Dept: INTERNAL MEDICINE | Age: 75
End: 2025-07-11

## 2025-07-11 DIAGNOSIS — I10 PRIMARY HYPERTENSION: ICD-10-CM

## 2025-07-11 RX ORDER — METOPROLOL SUCCINATE 100 MG/1
50 TABLET, EXTENDED RELEASE ORAL 2 TIMES DAILY
Qty: 90 TABLET | Refills: 1 | Status: SHIPPED | OUTPATIENT
Start: 2025-07-11

## 2025-07-11 NOTE — TELEPHONE ENCOUNTER
Patient calling 7/11/25 requesting a refill of their   metoprolol succinate (TOPROL XL) 100 MG extended release tablet [[id number]]  medication. Patient would like it sent to [facility] [place] [address] [facility] - P [phone] - F [phone]  .

## 2025-07-21 DIAGNOSIS — E04.2 NONTOXIC MULTINODULAR GOITER: Primary | ICD-10-CM

## 2025-08-06 ENCOUNTER — HOSPITAL ENCOUNTER (OUTPATIENT)
Dept: ULTRASOUND IMAGING | Age: 75
Discharge: HOME | End: 2025-08-06
Payer: MEDICARE

## 2025-08-06 DIAGNOSIS — E04.2 NONTOXIC MULTINODULAR GOITER: ICD-10-CM

## 2025-08-06 PROCEDURE — 76536 US EXAM OF HEAD AND NECK: CPT

## 2025-08-09 DIAGNOSIS — Z91.09 ENVIRONMENTAL ALLERGIES: ICD-10-CM

## 2025-08-12 RX ORDER — CETIRIZINE HYDROCHLORIDE 10 MG/1
10 TABLET ORAL DAILY
Qty: 90 TABLET | Refills: 1 | Status: SHIPPED | OUTPATIENT
Start: 2025-08-12

## 2025-08-27 ENCOUNTER — OFFICE VISIT (OUTPATIENT)
Dept: ENT CLINIC | Age: 75
End: 2025-08-27
Payer: MEDICARE

## 2025-08-27 VITALS
HEIGHT: 64 IN | SYSTOLIC BLOOD PRESSURE: 122 MMHG | BODY MASS INDEX: 28.17 KG/M2 | DIASTOLIC BLOOD PRESSURE: 80 MMHG | WEIGHT: 165 LBS

## 2025-08-27 DIAGNOSIS — E04.2 NONTOXIC MULTINODULAR GOITER: Primary | ICD-10-CM

## 2025-08-27 DIAGNOSIS — Z91.09 ENVIRONMENTAL ALLERGIES: ICD-10-CM

## 2025-08-27 PROCEDURE — 1123F ACP DISCUSS/DSCN MKR DOCD: CPT | Performed by: NURSE PRACTITIONER

## 2025-08-27 PROCEDURE — 99213 OFFICE O/P EST LOW 20 MIN: CPT | Performed by: NURSE PRACTITIONER

## 2025-08-27 PROCEDURE — G8417 CALC BMI ABV UP PARAM F/U: HCPCS | Performed by: NURSE PRACTITIONER

## 2025-08-27 PROCEDURE — 1160F RVW MEDS BY RX/DR IN RCRD: CPT | Performed by: NURSE PRACTITIONER

## 2025-08-27 PROCEDURE — 1159F MED LIST DOCD IN RCRD: CPT | Performed by: NURSE PRACTITIONER

## 2025-08-27 PROCEDURE — 3017F COLORECTAL CA SCREEN DOC REV: CPT | Performed by: NURSE PRACTITIONER

## 2025-08-27 PROCEDURE — 1090F PRES/ABSN URINE INCON ASSESS: CPT | Performed by: NURSE PRACTITIONER

## 2025-08-27 PROCEDURE — G8399 PT W/DXA RESULTS DOCUMENT: HCPCS | Performed by: NURSE PRACTITIONER

## 2025-08-27 PROCEDURE — G8427 DOCREV CUR MEDS BY ELIG CLIN: HCPCS | Performed by: NURSE PRACTITIONER

## 2025-08-27 PROCEDURE — 1036F TOBACCO NON-USER: CPT | Performed by: NURSE PRACTITIONER

## 2025-08-27 ASSESSMENT — ENCOUNTER SYMPTOMS
RESPIRATORY NEGATIVE: 1
ALLERGIC/IMMUNOLOGIC NEGATIVE: 1
GASTROINTESTINAL NEGATIVE: 1
TROUBLE SWALLOWING: 1
EYES NEGATIVE: 1

## 2025-09-01 SDOH — HEALTH STABILITY: PHYSICAL HEALTH: ON AVERAGE, HOW MANY DAYS PER WEEK DO YOU ENGAGE IN MODERATE TO STRENUOUS EXERCISE (LIKE A BRISK WALK)?: 2 DAYS

## 2025-09-01 SDOH — HEALTH STABILITY: PHYSICAL HEALTH: ON AVERAGE, HOW MANY MINUTES DO YOU ENGAGE IN EXERCISE AT THIS LEVEL?: 10 MIN

## 2025-09-01 ASSESSMENT — LIFESTYLE VARIABLES
HOW MANY STANDARD DRINKS CONTAINING ALCOHOL DO YOU HAVE ON A TYPICAL DAY: PATIENT DOES NOT DRINK
HOW OFTEN DO YOU HAVE A DRINK CONTAINING ALCOHOL: NEVER
HOW OFTEN DO YOU HAVE SIX OR MORE DRINKS ON ONE OCCASION: 1
HOW MANY STANDARD DRINKS CONTAINING ALCOHOL DO YOU HAVE ON A TYPICAL DAY: 0
HOW OFTEN DO YOU HAVE A DRINK CONTAINING ALCOHOL: 1

## 2025-09-01 ASSESSMENT — PATIENT HEALTH QUESTIONNAIRE - PHQ9
SUM OF ALL RESPONSES TO PHQ QUESTIONS 1-9: 0
2. FEELING DOWN, DEPRESSED OR HOPELESS: NOT AT ALL
SUM OF ALL RESPONSES TO PHQ QUESTIONS 1-9: 0
1. LITTLE INTEREST OR PLEASURE IN DOING THINGS: NOT AT ALL

## 2025-09-02 ENCOUNTER — TELEPHONE (OUTPATIENT)
Dept: INTERNAL MEDICINE | Age: 75
End: 2025-09-02

## 2025-09-02 ENCOUNTER — OFFICE VISIT (OUTPATIENT)
Dept: INTERNAL MEDICINE | Age: 75
End: 2025-09-02
Payer: MEDICARE

## 2025-09-02 VITALS
RESPIRATION RATE: 20 BRPM | OXYGEN SATURATION: 98 % | SYSTOLIC BLOOD PRESSURE: 132 MMHG | WEIGHT: 166 LBS | HEIGHT: 64 IN | BODY MASS INDEX: 28.34 KG/M2 | HEART RATE: 68 BPM | DIASTOLIC BLOOD PRESSURE: 84 MMHG

## 2025-09-02 DIAGNOSIS — E78.5 HYPERLIPIDEMIA, UNSPECIFIED HYPERLIPIDEMIA TYPE: ICD-10-CM

## 2025-09-02 DIAGNOSIS — E55.9 VITAMIN D DEFICIENCY: ICD-10-CM

## 2025-09-02 DIAGNOSIS — Z91.09 ENVIRONMENTAL ALLERGIES: ICD-10-CM

## 2025-09-02 DIAGNOSIS — R73.9 HYPERGLYCEMIA: ICD-10-CM

## 2025-09-02 DIAGNOSIS — G89.29 CHRONIC LOW BACK PAIN, UNSPECIFIED BACK PAIN LATERALITY, UNSPECIFIED WHETHER SCIATICA PRESENT: ICD-10-CM

## 2025-09-02 DIAGNOSIS — Z00.00 ROUTINE HEALTH MAINTENANCE: Primary | ICD-10-CM

## 2025-09-02 DIAGNOSIS — Z78.0 MENOPAUSE: ICD-10-CM

## 2025-09-02 DIAGNOSIS — Z12.31 ENCOUNTER FOR SCREENING MAMMOGRAM FOR MALIGNANT NEOPLASM OF BREAST: ICD-10-CM

## 2025-09-02 DIAGNOSIS — M54.50 CHRONIC LOW BACK PAIN, UNSPECIFIED BACK PAIN LATERALITY, UNSPECIFIED WHETHER SCIATICA PRESENT: ICD-10-CM

## 2025-09-02 DIAGNOSIS — M54.9 CHRONIC BACK PAIN, UNSPECIFIED BACK LOCATION, UNSPECIFIED BACK PAIN LATERALITY: ICD-10-CM

## 2025-09-02 DIAGNOSIS — G89.29 CHRONIC BACK PAIN, UNSPECIFIED BACK LOCATION, UNSPECIFIED BACK PAIN LATERALITY: ICD-10-CM

## 2025-09-02 DIAGNOSIS — I10 PRIMARY HYPERTENSION: ICD-10-CM

## 2025-09-02 DIAGNOSIS — R53.83 OTHER FATIGUE: ICD-10-CM

## 2025-09-02 DIAGNOSIS — K21.9 GASTROESOPHAGEAL REFLUX DISEASE WITHOUT ESOPHAGITIS: ICD-10-CM

## 2025-09-02 PROCEDURE — 1159F MED LIST DOCD IN RCRD: CPT | Performed by: INTERNAL MEDICINE

## 2025-09-02 PROCEDURE — 3079F DIAST BP 80-89 MM HG: CPT | Performed by: INTERNAL MEDICINE

## 2025-09-02 PROCEDURE — G0439 PPPS, SUBSEQ VISIT: HCPCS | Performed by: INTERNAL MEDICINE

## 2025-09-02 PROCEDURE — 3075F SYST BP GE 130 - 139MM HG: CPT | Performed by: INTERNAL MEDICINE

## 2025-09-02 PROCEDURE — 1123F ACP DISCUSS/DSCN MKR DOCD: CPT | Performed by: INTERNAL MEDICINE

## 2025-09-02 PROCEDURE — 3017F COLORECTAL CA SCREEN DOC REV: CPT | Performed by: INTERNAL MEDICINE

## 2025-09-03 ASSESSMENT — ENCOUNTER SYMPTOMS
VOMITING: 0
BACK PAIN: 0
BLOOD IN STOOL: 0
CHEST TIGHTNESS: 0
CHOKING: 0
SORE THROAT: 0
TROUBLE SWALLOWING: 0
VOICE CHANGE: 0
FACIAL SWELLING: 0
ABDOMINAL PAIN: 0
ABDOMINAL DISTENTION: 0
SINUS PRESSURE: 0
EYE DISCHARGE: 0
SHORTNESS OF BREATH: 0
WHEEZING: 0
RECTAL PAIN: 0
SINUS PAIN: 0
EYE PAIN: 0
COUGH: 0
EYE ITCHING: 0
PHOTOPHOBIA: 0
COLOR CHANGE: 0
CONSTIPATION: 0
DIARRHEA: 0
RHINORRHEA: 0

## 2025-09-05 ENCOUNTER — TELEPHONE (OUTPATIENT)
Dept: INTERNAL MEDICINE | Age: 75
End: 2025-09-05

## 2025-09-05 DIAGNOSIS — I10 PRIMARY HYPERTENSION: ICD-10-CM

## 2025-09-05 RX ORDER — LOSARTAN POTASSIUM 50 MG/1
50 TABLET ORAL 2 TIMES DAILY
Qty: 60 TABLET | Refills: 1 | Status: SHIPPED | OUTPATIENT
Start: 2025-09-05

## (undated) DEVICE — PACK,UNIVERSAL,NO GOWNS: Brand: MEDLINE

## (undated) DEVICE — CHLORAPREP 26ML ORANGE

## (undated) DEVICE — ELECTROSURGICAL PENCIL BUTTON SWITCH NON COATED BLADE ELECTRODE 10 FT (3 M) CORD HOLSTER: Brand: MEGADYNE

## (undated) DEVICE — GLOVE SURG SZ 7 CRM LTX FREE POLYISOPRENE POLYMER BEAD ANTI

## (undated) DEVICE — PLATE ES AD W 9FT CRD 2

## (undated) DEVICE — ADHESIVE SKIN CLSR 0.7ML TOP DERMBND ADV

## (undated) DEVICE — MASK ANES AD M SZ 5 PREM TAIL VLV INFL PRT UNSCENTED HK RNG

## (undated) DEVICE — AMBU AURAONCE U SIZE 4: Brand: AURAONCE

## (undated) DEVICE — CIRCUIT AD PLN SWVL WYE